# Patient Record
Sex: FEMALE | Race: WHITE | Employment: FULL TIME | URBAN - METROPOLITAN AREA
[De-identification: names, ages, dates, MRNs, and addresses within clinical notes are randomized per-mention and may not be internally consistent; named-entity substitution may affect disease eponyms.]

---

## 2017-02-13 RX ORDER — ATORVASTATIN CALCIUM 40 MG/1
TABLET, FILM COATED ORAL
Qty: 90 TABLET | Refills: 1 | OUTPATIENT
Start: 2017-02-13

## 2017-02-20 ENCOUNTER — OFFICE VISIT (OUTPATIENT)
Dept: FAMILY MEDICINE CLINIC | Age: 62
End: 2017-02-20

## 2017-02-20 VITALS
HEIGHT: 66 IN | DIASTOLIC BLOOD PRESSURE: 76 MMHG | SYSTOLIC BLOOD PRESSURE: 108 MMHG | RESPIRATION RATE: 12 BRPM | OXYGEN SATURATION: 98 % | WEIGHT: 177 LBS | BODY MASS INDEX: 28.45 KG/M2 | HEART RATE: 74 BPM

## 2017-02-20 DIAGNOSIS — E78.00 PURE HYPERCHOLESTEROLEMIA: ICD-10-CM

## 2017-02-20 DIAGNOSIS — N18.30 CHRONIC KIDNEY DISEASE, STAGE III (MODERATE) (HCC): ICD-10-CM

## 2017-02-20 DIAGNOSIS — R73.03 PREDIABETES: ICD-10-CM

## 2017-02-20 DIAGNOSIS — M81.0 OSTEOPOROSIS: ICD-10-CM

## 2017-02-20 DIAGNOSIS — E55.9 VITAMIN D DEFICIENCY: ICD-10-CM

## 2017-02-20 DIAGNOSIS — I10 ESSENTIAL HYPERTENSION: Primary | ICD-10-CM

## 2017-02-20 LAB
ALBUMIN SERPL-MCNC: 4.2 G/DL (ref 3.4–5)
ALP BLD-CCNC: 75 U/L (ref 40–129)
ALT SERPL-CCNC: 13 U/L (ref 10–40)
ANION GAP SERPL CALCULATED.3IONS-SCNC: 15 MMOL/L (ref 3–16)
AST SERPL-CCNC: 12 U/L (ref 15–37)
BILIRUB SERPL-MCNC: 0.3 MG/DL (ref 0–1)
BILIRUBIN DIRECT: <0.2 MG/DL (ref 0–0.3)
BILIRUBIN, INDIRECT: ABNORMAL MG/DL (ref 0–1)
BUN BLDV-MCNC: 14 MG/DL (ref 7–20)
CALCIUM SERPL-MCNC: 10.2 MG/DL (ref 8.3–10.6)
CHLORIDE BLD-SCNC: 103 MMOL/L (ref 99–110)
CHOLESTEROL, TOTAL: 164 MG/DL (ref 0–199)
CO2: 23 MMOL/L (ref 21–32)
CREAT SERPL-MCNC: 0.8 MG/DL (ref 0.6–1.2)
GFR AFRICAN AMERICAN: >60
GFR NON-AFRICAN AMERICAN: >60
GLUCOSE BLD-MCNC: 101 MG/DL (ref 70–99)
HBA1C MFR BLD: 6.2 %
HDLC SERPL-MCNC: 57 MG/DL (ref 40–60)
LDL CHOLESTEROL CALCULATED: 92 MG/DL
POTASSIUM SERPL-SCNC: 4.5 MMOL/L (ref 3.5–5.1)
SODIUM BLD-SCNC: 141 MMOL/L (ref 136–145)
TOTAL PROTEIN: 7.4 G/DL (ref 6.4–8.2)
TRIGL SERPL-MCNC: 73 MG/DL (ref 0–150)
VITAMIN D 25-HYDROXY: 58.5 NG/ML
VLDLC SERPL CALC-MCNC: 15 MG/DL

## 2017-02-20 PROCEDURE — 83036 HEMOGLOBIN GLYCOSYLATED A1C: CPT | Performed by: FAMILY MEDICINE

## 2017-02-20 PROCEDURE — 36415 COLL VENOUS BLD VENIPUNCTURE: CPT | Performed by: FAMILY MEDICINE

## 2017-02-20 PROCEDURE — 99214 OFFICE O/P EST MOD 30 MIN: CPT | Performed by: FAMILY MEDICINE

## 2017-02-20 RX ORDER — LISINOPRIL 10 MG/1
TABLET ORAL
Qty: 90 TABLET | Refills: 1 | Status: SHIPPED | OUTPATIENT
Start: 2017-02-20 | End: 2017-08-18 | Stop reason: SDUPTHER

## 2017-02-20 RX ORDER — ALENDRONATE SODIUM 70 MG/1
70 TABLET ORAL
Qty: 12 TABLET | Refills: 3 | Status: SHIPPED | OUTPATIENT
Start: 2017-02-20 | End: 2018-01-12 | Stop reason: SDUPTHER

## 2017-02-20 RX ORDER — ATORVASTATIN CALCIUM 40 MG/1
TABLET, FILM COATED ORAL
Qty: 90 TABLET | Refills: 1 | Status: SHIPPED | OUTPATIENT
Start: 2017-02-20 | End: 2017-07-29 | Stop reason: SDUPTHER

## 2017-04-17 PROBLEM — D36.9 TUBULAR ADENOMA: Status: ACTIVE | Noted: 2017-04-17

## 2017-07-29 DIAGNOSIS — E78.00 PURE HYPERCHOLESTEROLEMIA: ICD-10-CM

## 2017-07-31 RX ORDER — ATORVASTATIN CALCIUM 40 MG/1
TABLET, FILM COATED ORAL
Qty: 30 TABLET | Refills: 0 | Status: SHIPPED | OUTPATIENT
Start: 2017-07-31 | End: 2017-08-18 | Stop reason: SDUPTHER

## 2017-08-18 ENCOUNTER — OFFICE VISIT (OUTPATIENT)
Dept: FAMILY MEDICINE CLINIC | Age: 62
End: 2017-08-18

## 2017-08-18 VITALS
SYSTOLIC BLOOD PRESSURE: 104 MMHG | DIASTOLIC BLOOD PRESSURE: 78 MMHG | HEART RATE: 75 BPM | WEIGHT: 178 LBS | BODY MASS INDEX: 28.61 KG/M2 | HEIGHT: 66 IN | RESPIRATION RATE: 12 BRPM

## 2017-08-18 DIAGNOSIS — Z23 FLU VACCINE NEED: ICD-10-CM

## 2017-08-18 DIAGNOSIS — N18.30 CHRONIC KIDNEY DISEASE, STAGE III (MODERATE) (HCC): ICD-10-CM

## 2017-08-18 DIAGNOSIS — Z11.4 SCREENING FOR HIV (HUMAN IMMUNODEFICIENCY VIRUS): ICD-10-CM

## 2017-08-18 DIAGNOSIS — M81.6 LOCALIZED OSTEOPOROSIS WITHOUT CURRENT PATHOLOGICAL FRACTURE: ICD-10-CM

## 2017-08-18 DIAGNOSIS — I10 ESSENTIAL HYPERTENSION: Primary | ICD-10-CM

## 2017-08-18 DIAGNOSIS — R73.03 PREDIABETES: ICD-10-CM

## 2017-08-18 DIAGNOSIS — E55.9 VITAMIN D DEFICIENCY: ICD-10-CM

## 2017-08-18 DIAGNOSIS — E78.00 PURE HYPERCHOLESTEROLEMIA: ICD-10-CM

## 2017-08-18 DIAGNOSIS — Z23 NEED FOR INFLUENZA VACCINATION: ICD-10-CM

## 2017-08-18 DIAGNOSIS — Z11.59 NEED FOR HEPATITIS C SCREENING TEST: ICD-10-CM

## 2017-08-18 LAB
ANION GAP SERPL CALCULATED.3IONS-SCNC: 20 MMOL/L (ref 3–16)
BUN BLDV-MCNC: 26 MG/DL (ref 7–20)
CALCIUM SERPL-MCNC: 10.3 MG/DL (ref 8.3–10.6)
CHLORIDE BLD-SCNC: 99 MMOL/L (ref 99–110)
CO2: 18 MMOL/L (ref 21–32)
CREAT SERPL-MCNC: 0.9 MG/DL (ref 0.6–1.2)
GFR AFRICAN AMERICAN: >60
GFR NON-AFRICAN AMERICAN: >60
GLUCOSE BLD-MCNC: 95 MG/DL (ref 70–99)
HEPATITIS C ANTIBODY INTERPRETATION: NORMAL
POTASSIUM SERPL-SCNC: 4.1 MMOL/L (ref 3.5–5.1)
SODIUM BLD-SCNC: 137 MMOL/L (ref 136–145)

## 2017-08-18 PROCEDURE — 90471 IMMUNIZATION ADMIN: CPT | Performed by: FAMILY MEDICINE

## 2017-08-18 PROCEDURE — 36415 COLL VENOUS BLD VENIPUNCTURE: CPT | Performed by: FAMILY MEDICINE

## 2017-08-18 PROCEDURE — 99214 OFFICE O/P EST MOD 30 MIN: CPT | Performed by: FAMILY MEDICINE

## 2017-08-18 PROCEDURE — 90662 IIV NO PRSV INCREASED AG IM: CPT | Performed by: FAMILY MEDICINE

## 2017-08-18 RX ORDER — ATORVASTATIN CALCIUM 40 MG/1
TABLET, FILM COATED ORAL
Qty: 90 TABLET | Refills: 1 | Status: SHIPPED | OUTPATIENT
Start: 2017-08-18 | End: 2018-03-05 | Stop reason: SDUPTHER

## 2017-08-18 RX ORDER — LISINOPRIL 10 MG/1
TABLET ORAL
Qty: 90 TABLET | Refills: 1 | Status: SHIPPED | OUTPATIENT
Start: 2017-08-18 | End: 2018-02-25 | Stop reason: SDUPTHER

## 2017-08-18 ASSESSMENT — PATIENT HEALTH QUESTIONNAIRE - PHQ9
2. FEELING DOWN, DEPRESSED OR HOPELESS: 0
1. LITTLE INTEREST OR PLEASURE IN DOING THINGS: 0
SUM OF ALL RESPONSES TO PHQ9 QUESTIONS 1 & 2: 0
SUM OF ALL RESPONSES TO PHQ QUESTIONS 1-9: 0

## 2017-08-19 LAB
ESTIMATED AVERAGE GLUCOSE: 131.2 MG/DL
HBA1C MFR BLD: 6.2 %

## 2017-08-21 LAB — HIV-1 AND HIV-2 ANTIBODIES: NORMAL

## 2017-08-25 ENCOUNTER — HOSPITAL ENCOUNTER (OUTPATIENT)
Dept: WOMENS IMAGING | Age: 62
Discharge: HOME | End: 2017-08-25
Attending: OBSTETRICS & GYNECOLOGY | Admitting: OBSTETRICS & GYNECOLOGY

## 2017-08-25 DIAGNOSIS — Z12.31 VISIT FOR SCREENING MAMMOGRAM: ICD-10-CM

## 2018-01-12 DIAGNOSIS — M81.0 OSTEOPOROSIS: ICD-10-CM

## 2018-01-12 RX ORDER — ALENDRONATE SODIUM 70 MG/1
TABLET ORAL
Qty: 12 TABLET | Refills: 3 | Status: SHIPPED | OUTPATIENT
Start: 2018-01-12 | End: 2018-12-06 | Stop reason: SDUPTHER

## 2018-01-15 ENCOUNTER — OFFICE VISIT (OUTPATIENT)
Dept: FAMILY MEDICINE CLINIC | Age: 63
End: 2018-01-15

## 2018-01-15 VITALS
OXYGEN SATURATION: 96 % | SYSTOLIC BLOOD PRESSURE: 122 MMHG | WEIGHT: 182 LBS | RESPIRATION RATE: 16 BRPM | TEMPERATURE: 98.5 F | BODY MASS INDEX: 29.25 KG/M2 | DIASTOLIC BLOOD PRESSURE: 78 MMHG | HEIGHT: 66 IN | HEART RATE: 87 BPM

## 2018-01-15 DIAGNOSIS — S16.1XXA STRAIN OF STERNOCLEIDOMASTOID MUSCLE, INITIAL ENCOUNTER: Primary | ICD-10-CM

## 2018-01-15 PROCEDURE — 1036F TOBACCO NON-USER: CPT | Performed by: FAMILY MEDICINE

## 2018-01-15 PROCEDURE — G8427 DOCREV CUR MEDS BY ELIG CLIN: HCPCS | Performed by: FAMILY MEDICINE

## 2018-01-15 PROCEDURE — G8419 CALC BMI OUT NRM PARAM NOF/U: HCPCS | Performed by: FAMILY MEDICINE

## 2018-01-15 PROCEDURE — 3017F COLORECTAL CA SCREEN DOC REV: CPT | Performed by: FAMILY MEDICINE

## 2018-01-15 PROCEDURE — G8484 FLU IMMUNIZE NO ADMIN: HCPCS | Performed by: FAMILY MEDICINE

## 2018-01-15 PROCEDURE — 99214 OFFICE O/P EST MOD 30 MIN: CPT | Performed by: FAMILY MEDICINE

## 2018-01-15 PROCEDURE — 3014F SCREEN MAMMO DOC REV: CPT | Performed by: FAMILY MEDICINE

## 2018-01-15 RX ORDER — NAPROXEN 500 MG/1
500 TABLET ORAL 2 TIMES DAILY WITH MEALS
Qty: 60 TABLET | Refills: 0 | Status: SHIPPED | OUTPATIENT
Start: 2018-01-15 | End: 2019-02-15

## 2018-02-25 DIAGNOSIS — I10 ESSENTIAL HYPERTENSION: ICD-10-CM

## 2018-02-27 ENCOUNTER — TELEPHONE (OUTPATIENT)
Dept: FAMILY MEDICINE CLINIC | Age: 63
End: 2018-02-27

## 2018-02-27 RX ORDER — LISINOPRIL 10 MG/1
TABLET ORAL
Qty: 30 TABLET | Refills: 0 | Status: SHIPPED | OUTPATIENT
Start: 2018-02-27 | End: 2018-03-05 | Stop reason: SDUPTHER

## 2018-03-05 ENCOUNTER — OFFICE VISIT (OUTPATIENT)
Dept: FAMILY MEDICINE CLINIC | Age: 63
End: 2018-03-05

## 2018-03-05 VITALS
WEIGHT: 183 LBS | SYSTOLIC BLOOD PRESSURE: 106 MMHG | HEIGHT: 66 IN | HEART RATE: 84 BPM | RESPIRATION RATE: 16 BRPM | DIASTOLIC BLOOD PRESSURE: 68 MMHG | BODY MASS INDEX: 29.41 KG/M2

## 2018-03-05 DIAGNOSIS — R73.03 PREDIABETES: Primary | ICD-10-CM

## 2018-03-05 DIAGNOSIS — M81.6 LOCALIZED OSTEOPOROSIS WITHOUT CURRENT PATHOLOGICAL FRACTURE: ICD-10-CM

## 2018-03-05 DIAGNOSIS — E55.9 VITAMIN D DEFICIENCY: ICD-10-CM

## 2018-03-05 DIAGNOSIS — Z23 NEED FOR SHINGLES VACCINE: ICD-10-CM

## 2018-03-05 DIAGNOSIS — N18.30 CHRONIC KIDNEY DISEASE, STAGE III (MODERATE) (HCC): ICD-10-CM

## 2018-03-05 DIAGNOSIS — I10 ESSENTIAL HYPERTENSION: ICD-10-CM

## 2018-03-05 DIAGNOSIS — E78.00 PURE HYPERCHOLESTEROLEMIA: ICD-10-CM

## 2018-03-05 LAB
A/G RATIO: 1.4 (ref 1.1–2.2)
ALBUMIN SERPL-MCNC: 4.4 G/DL (ref 3.4–5)
ALP BLD-CCNC: 56 U/L (ref 40–129)
ALT SERPL-CCNC: 16 U/L (ref 10–40)
ANION GAP SERPL CALCULATED.3IONS-SCNC: 14 MMOL/L (ref 3–16)
AST SERPL-CCNC: 14 U/L (ref 15–37)
BILIRUB SERPL-MCNC: 0.3 MG/DL (ref 0–1)
BUN BLDV-MCNC: 16 MG/DL (ref 7–20)
CALCIUM SERPL-MCNC: 9.5 MG/DL (ref 8.3–10.6)
CHLORIDE BLD-SCNC: 102 MMOL/L (ref 99–110)
CHOLESTEROL, TOTAL: 164 MG/DL (ref 0–199)
CO2: 23 MMOL/L (ref 21–32)
CREAT SERPL-MCNC: 0.8 MG/DL (ref 0.6–1.2)
GFR AFRICAN AMERICAN: >60
GFR NON-AFRICAN AMERICAN: >60
GLOBULIN: 3.2 G/DL
GLUCOSE BLD-MCNC: 96 MG/DL (ref 70–99)
HBA1C MFR BLD: 6.1 %
HDLC SERPL-MCNC: 57 MG/DL (ref 40–60)
LDL CHOLESTEROL CALCULATED: 91 MG/DL
POTASSIUM SERPL-SCNC: 4.7 MMOL/L (ref 3.5–5.1)
SODIUM BLD-SCNC: 139 MMOL/L (ref 136–145)
TOTAL PROTEIN: 7.6 G/DL (ref 6.4–8.2)
TRIGL SERPL-MCNC: 80 MG/DL (ref 0–150)
VITAMIN D 25-HYDROXY: 55.3 NG/ML
VLDLC SERPL CALC-MCNC: 16 MG/DL

## 2018-03-05 PROCEDURE — 36415 COLL VENOUS BLD VENIPUNCTURE: CPT | Performed by: FAMILY MEDICINE

## 2018-03-05 PROCEDURE — 3017F COLORECTAL CA SCREEN DOC REV: CPT | Performed by: FAMILY MEDICINE

## 2018-03-05 PROCEDURE — G8419 CALC BMI OUT NRM PARAM NOF/U: HCPCS | Performed by: FAMILY MEDICINE

## 2018-03-05 PROCEDURE — 99214 OFFICE O/P EST MOD 30 MIN: CPT | Performed by: FAMILY MEDICINE

## 2018-03-05 PROCEDURE — 3014F SCREEN MAMMO DOC REV: CPT | Performed by: FAMILY MEDICINE

## 2018-03-05 PROCEDURE — 1036F TOBACCO NON-USER: CPT | Performed by: FAMILY MEDICINE

## 2018-03-05 PROCEDURE — 83036 HEMOGLOBIN GLYCOSYLATED A1C: CPT | Performed by: FAMILY MEDICINE

## 2018-03-05 PROCEDURE — G8427 DOCREV CUR MEDS BY ELIG CLIN: HCPCS | Performed by: FAMILY MEDICINE

## 2018-03-05 PROCEDURE — G8484 FLU IMMUNIZE NO ADMIN: HCPCS | Performed by: FAMILY MEDICINE

## 2018-03-05 RX ORDER — LISINOPRIL 10 MG/1
TABLET ORAL
Qty: 90 TABLET | Refills: 1 | Status: SHIPPED | OUTPATIENT
Start: 2018-03-05 | End: 2018-09-01 | Stop reason: SDUPTHER

## 2018-03-05 RX ORDER — ATORVASTATIN CALCIUM 40 MG/1
TABLET, FILM COATED ORAL
Qty: 90 TABLET | Refills: 1 | Status: SHIPPED | OUTPATIENT
Start: 2018-03-05 | End: 2018-08-14 | Stop reason: SDUPTHER

## 2018-03-05 NOTE — PROGRESS NOTES
Subjective:      Patient ID: Alease Holstein is a 61 y.o. female. HPI     CC:  HTN, HLD, prediabetes, stage 3 CKD, osteoporosis      Treatment Adherence:   Medication compliance:  compliant all of the time  Diet compliance:  compliant most of the time  Weight trend: stable  Current exercise: no regular exercise  Barriers: none    Prediabetes: pt denies  blurry vision, foot ulcerations, neuropathy, polyphagia, polyuria, polydipsia, nausea, vomiting and diarrhea. Pt feels like she has been doing good keeping her carbs at proper portion sizes. She plans on joining a walking group in [place], as she is not currently getting any exercise. Home blood sugar records: patient does not test      Hypertension:  Home blood pressure monitoring: No.  She is adherent to a low sodium diet. Patient denies chest pain, shortness of breath, headache, lightheadedness, blurred vision, peripheral edema, palpitations, dry cough and fatigue. Antihypertensive medication side effects: no medication side effects noted. Use of agents associated with hypertension: none. Hyperlipidemia:  No new myalgias or GI upset on atorvastatin (Lipitor). Lab Results   Component Value Date    LABA1C 6.2 08/18/2017    LABA1C 6.2 02/20/2017    LABA1C 6.0 08/17/2016     Lab Results   Component Value Date    CREATININE 0.9 08/18/2017     Lab Results   Component Value Date    ALT 13 02/20/2017    AST 12 (L) 02/20/2017     Lab Results   Component Value Date    CHOL 164 02/20/2017    TRIG 73 02/20/2017    HDL 57 02/20/2017    LDLCALC 92 02/20/2017           Chronic kidney disease, stage III (moderate)  Pt had 3 in a row that were in stage 3 range, but last 2 were WNLs.      2/3/16: ULTRASOUND KIDNEYS  Impression   IMPRESSION:   Mild diffuse increased echogenicity kidneys consistent with medical renal   disease.  No hydronephrosis or focal lesion.          Osteoporosis/osteopenia:  Current pharmacologic therapy and date started Fosamax 70mg and started June 2017. Medication side effects: none. Last DXA:  10/19/2016-  T score at spine -2.7 and lowest hip -1.8. Current calcium intake is at least 1200 mg/day from diet and supplements: yes. She is currently taking 1000 IU/day of supplemental vitamin D. Regular weight-bearing exercise: no. The patient's mother had osteoporosis and broke her hip. Vit D, 25-Hydroxy (ng/mL)   Date Value   02/20/2017 58.5           Vitals:    03/05/18 1007   BP: 106/68   Pulse: 84   Resp: 16   Weight: 183 lb (83 kg)   Height: 5' 6\" (1.676 m)       Outpatient Prescriptions Marked as Taking for the 3/5/18 encounter (Office Visit) with Mylene James MD   Medication Sig Dispense Refill    lisinopril (PRINIVIL;ZESTRIL) 10 MG tablet TAKE 1 TABLET DAILY 90 tablet 1    atorvastatin (LIPITOR) 40 MG tablet TAKE 1 TABLET DAILY 90 tablet 1    zoster recombinant adjuvanted vaccine (SHINGRIX) 50 MCG SUSR injection Inject 0.5 mLs into the muscle once for 1 dose 0.5 mL 0    Ibuprofen (ADVIL PO) Take by mouth      naproxen (NAPROSYN) 500 MG tablet Take 1 tablet by mouth 2 times daily (with meals) 60 tablet 0    alendronate (FOSAMAX) 70 MG tablet TAKE 1 TABLET EVERY 7 DAYS 12 tablet 3    montelukast (SINGULAIR) 10 MG tablet Take 10 mg by mouth nightly.  cetirizine (ZYRTEC) 10 MG tablet Take 10 mg by mouth as needed for Allergies.  mometasone (NASONEX) 50 MCG/ACT nasal spray 2 sprays by Nasal route daily.  vitamin D (CHOLECALCIFEROL) 1000 UNIT TABS tablet Take 1,000 Units by mouth daily.  Omega-3 Fatty Acids (FISH OIL PO) Take  by mouth.  aspirin 81 MG tablet Take 81 mg by mouth daily. Past Medical History:   Diagnosis Date    Allergic rhinitis     saw allergist, dr Maryanne Buchanan of years.   had allergy shots in the past    Chronic kidney disease, stage III (moderate)     Hyperlipidemia LDL goal < 130     Hypertension     Osteoporosis     Prediabetes        Past Surgical History: Fosamax. Will recheck DEXA in June 2018 as this is when patient will be on meds for one year. Checking vitamin D today to ensure it is within normal limits.     HM  shingrix script given today

## 2018-08-14 DIAGNOSIS — E78.00 PURE HYPERCHOLESTEROLEMIA: ICD-10-CM

## 2018-08-14 RX ORDER — ATORVASTATIN CALCIUM 40 MG/1
TABLET, FILM COATED ORAL
Qty: 90 TABLET | Refills: 1 | Status: SHIPPED | OUTPATIENT
Start: 2018-08-14 | End: 2019-02-15 | Stop reason: SDUPTHER

## 2018-09-01 DIAGNOSIS — I10 ESSENTIAL HYPERTENSION: ICD-10-CM

## 2018-09-04 RX ORDER — LISINOPRIL 10 MG/1
TABLET ORAL
Qty: 90 TABLET | Refills: 1 | Status: SHIPPED | OUTPATIENT
Start: 2018-09-04 | End: 2018-09-25 | Stop reason: ALTCHOICE

## 2018-09-25 ENCOUNTER — OFFICE VISIT (OUTPATIENT)
Dept: FAMILY MEDICINE CLINIC | Age: 63
End: 2018-09-25
Payer: COMMERCIAL

## 2018-09-25 VITALS
RESPIRATION RATE: 12 BRPM | WEIGHT: 183 LBS | SYSTOLIC BLOOD PRESSURE: 94 MMHG | DIASTOLIC BLOOD PRESSURE: 64 MMHG | OXYGEN SATURATION: 94 % | BODY MASS INDEX: 29.41 KG/M2 | HEIGHT: 66 IN | HEART RATE: 94 BPM

## 2018-09-25 DIAGNOSIS — E78.00 PURE HYPERCHOLESTEROLEMIA: ICD-10-CM

## 2018-09-25 DIAGNOSIS — I10 ESSENTIAL HYPERTENSION: ICD-10-CM

## 2018-09-25 DIAGNOSIS — Z23 FLU VACCINE NEED: ICD-10-CM

## 2018-09-25 DIAGNOSIS — M81.6 LOCALIZED OSTEOPOROSIS WITHOUT CURRENT PATHOLOGICAL FRACTURE: ICD-10-CM

## 2018-09-25 DIAGNOSIS — R73.03 PREDIABETES: Primary | ICD-10-CM

## 2018-09-25 LAB
ANION GAP SERPL CALCULATED.3IONS-SCNC: 15 MMOL/L (ref 3–16)
BUN BLDV-MCNC: 26 MG/DL (ref 7–20)
CALCIUM SERPL-MCNC: 11.1 MG/DL (ref 8.3–10.6)
CHLORIDE BLD-SCNC: 98 MMOL/L (ref 99–110)
CO2: 23 MMOL/L (ref 21–32)
CREAT SERPL-MCNC: 1 MG/DL (ref 0.6–1.2)
GFR AFRICAN AMERICAN: >60
GFR NON-AFRICAN AMERICAN: 56
GLUCOSE BLD-MCNC: 96 MG/DL (ref 70–99)
HBA1C MFR BLD: 6.3 %
POTASSIUM SERPL-SCNC: 4.6 MMOL/L (ref 3.5–5.1)
SODIUM BLD-SCNC: 136 MMOL/L (ref 136–145)

## 2018-09-25 PROCEDURE — 3017F COLORECTAL CA SCREEN DOC REV: CPT | Performed by: FAMILY MEDICINE

## 2018-09-25 PROCEDURE — G8419 CALC BMI OUT NRM PARAM NOF/U: HCPCS | Performed by: FAMILY MEDICINE

## 2018-09-25 PROCEDURE — G8427 DOCREV CUR MEDS BY ELIG CLIN: HCPCS | Performed by: FAMILY MEDICINE

## 2018-09-25 PROCEDURE — 1036F TOBACCO NON-USER: CPT | Performed by: FAMILY MEDICINE

## 2018-09-25 PROCEDURE — 83036 HEMOGLOBIN GLYCOSYLATED A1C: CPT | Performed by: FAMILY MEDICINE

## 2018-09-25 PROCEDURE — 36415 COLL VENOUS BLD VENIPUNCTURE: CPT | Performed by: FAMILY MEDICINE

## 2018-09-25 PROCEDURE — 90682 RIV4 VACC RECOMBINANT DNA IM: CPT | Performed by: FAMILY MEDICINE

## 2018-09-25 PROCEDURE — 99214 OFFICE O/P EST MOD 30 MIN: CPT | Performed by: FAMILY MEDICINE

## 2018-09-25 PROCEDURE — 90471 IMMUNIZATION ADMIN: CPT | Performed by: FAMILY MEDICINE

## 2018-09-25 RX ORDER — LISINOPRIL 5 MG/1
5 TABLET ORAL DAILY
Qty: 90 TABLET | Refills: 1
Start: 2018-09-25 | End: 2019-10-14

## 2018-09-25 ASSESSMENT — PATIENT HEALTH QUESTIONNAIRE - PHQ9
SUM OF ALL RESPONSES TO PHQ9 QUESTIONS 1 & 2: 0
2. FEELING DOWN, DEPRESSED OR HOPELESS: 0
1. LITTLE INTEREST OR PLEASURE IN DOING THINGS: 0
SUM OF ALL RESPONSES TO PHQ QUESTIONS 1-9: 0
SUM OF ALL RESPONSES TO PHQ QUESTIONS 1-9: 0

## 2018-09-25 NOTE — PROGRESS NOTES
thyromegaly  Resp:  · Normal effort  · Clear to auscultation bilaterally without rhonchi, wheezing or crackles  Cardiovascular:  · On auscultation, normal S1 and S2 without murmurs, rubs or gallops  · No bruits of bilateral carotids and no JVD  Gastrointestinal:  · Nontender, nondistended, and no masses  · No hepatosplenomegaly  Musculoskeletal:  · Normal Gait  · All extremities without clubbing, cyanosis or edema  Psych:  · Normal mood and affect  · Normal insight and judgement      Assessment:      See below       Plan:       1. Essential hypertension  Too low today. Pt hasn't eaten yet today and it is mid afternoon. Decreasing lisinopril to 5mg. Checking BMP . 2. Prediabetes  Poc A1c equals 6.3. Worsening. Encouraged patient to keep carbs to no more than 45 g per meal or less and no more than 15 g per snack or less. Patient to walk 30 minutes daily. 3. Hyperlipidemia   Continue Lipitor. Reviewed most recent fasting lipid panel and LFTs in both within normal limits. 4.  osteoporosis  Patient to continue Fosamax.     Checking DEXA scan now that on meds for one year    HM  shingrix script given today    Encouraged to schedule pap smear    Flu shot given today    F/u 3 months

## 2018-09-26 ENCOUNTER — TELEPHONE (OUTPATIENT)
Dept: FAMILY MEDICINE CLINIC | Age: 63
End: 2018-09-26

## 2018-09-26 DIAGNOSIS — E83.52 HYPERCALCEMIA: Primary | ICD-10-CM

## 2018-09-26 LAB — PARATHYROID HORMONE INTACT: 11.9 PG/ML (ref 14–72)

## 2018-09-30 ENCOUNTER — TELEPHONE (OUTPATIENT)
Dept: FAMILY MEDICINE CLINIC | Age: 63
End: 2018-09-30

## 2018-10-02 ENCOUNTER — TELEPHONE (OUTPATIENT)
Dept: FAMILY MEDICINE CLINIC | Age: 63
End: 2018-10-02

## 2018-10-02 DIAGNOSIS — E83.52 SERUM CALCIUM ELEVATED: Primary | ICD-10-CM

## 2018-10-05 ENCOUNTER — OFFICE VISIT (OUTPATIENT)
Dept: FAMILY MEDICINE CLINIC | Age: 63
End: 2018-10-05
Payer: COMMERCIAL

## 2018-10-05 VITALS
BODY MASS INDEX: 29.09 KG/M2 | WEIGHT: 181 LBS | HEIGHT: 66 IN | SYSTOLIC BLOOD PRESSURE: 100 MMHG | HEART RATE: 74 BPM | DIASTOLIC BLOOD PRESSURE: 78 MMHG

## 2018-10-05 DIAGNOSIS — E83.52 HYPERCALCEMIA: ICD-10-CM

## 2018-10-05 DIAGNOSIS — E83.52 SERUM CALCIUM ELEVATED: ICD-10-CM

## 2018-10-05 DIAGNOSIS — E83.52 HYPERCALCEMIA: Primary | ICD-10-CM

## 2018-10-05 LAB
ALBUMIN SERPL-MCNC: 4.3 G/DL (ref 3.4–5)
ALP BLD-CCNC: 62 U/L (ref 40–129)
ALT SERPL-CCNC: 15 U/L (ref 10–40)
AST SERPL-CCNC: 15 U/L (ref 15–37)
BILIRUB SERPL-MCNC: 0.3 MG/DL (ref 0–1)
BILIRUBIN DIRECT: <0.2 MG/DL (ref 0–0.3)
BILIRUBIN, INDIRECT: NORMAL MG/DL (ref 0–1)
TOTAL PROTEIN: 7.9 G/DL (ref 6.4–8.2)
TSH REFLEX FT4: 1.57 UIU/ML (ref 0.27–4.2)
VITAMIN D 25-HYDROXY: 51.1 NG/ML

## 2018-10-05 PROCEDURE — G8482 FLU IMMUNIZE ORDER/ADMIN: HCPCS | Performed by: FAMILY MEDICINE

## 2018-10-05 PROCEDURE — G8419 CALC BMI OUT NRM PARAM NOF/U: HCPCS | Performed by: FAMILY MEDICINE

## 2018-10-05 PROCEDURE — G8427 DOCREV CUR MEDS BY ELIG CLIN: HCPCS | Performed by: FAMILY MEDICINE

## 2018-10-05 PROCEDURE — 3017F COLORECTAL CA SCREEN DOC REV: CPT | Performed by: FAMILY MEDICINE

## 2018-10-05 PROCEDURE — 99214 OFFICE O/P EST MOD 30 MIN: CPT | Performed by: FAMILY MEDICINE

## 2018-10-05 PROCEDURE — 1036F TOBACCO NON-USER: CPT | Performed by: FAMILY MEDICINE

## 2018-10-05 NOTE — PROGRESS NOTES
Fatty Acids (FISH OIL PO) Take  by mouth.  aspirin 81 MG tablet Take 81 mg by mouth daily. Past Medical History:   Diagnosis Date    Allergic rhinitis     saw allergist, dr Uzair Jimenez of years.   had allergy shots in the past    Chronic kidney disease, stage III (moderate) (HCC)     Hyperlipidemia LDL goal < 130     Hypertension     Osteoporosis     Prediabetes        Past Surgical History:   Procedure Laterality Date    WISDOM TOOTH EXTRACTION         Social History   Substance Use Topics    Smoking status: Never Smoker    Smokeless tobacco: Never Used    Alcohol use Not on file       Family History   Problem Relation Age of Onset    High Blood Pressure Mother     Heart Disease Mother     Cancer Mother 79        breast    High Blood Pressure Father     Heart Disease Father     Heart Disease Paternal Grandfather     Cancer Other         one niece  of breast cancer in 45s, on  of lymphoma of 25s           ROS  Constitutional:  Negative for activity or appetite change, fever or fatigue  HENT:  Negative for congestion, sinus pressure, or rhinorrhea  Eyes:  Negative for eye pain or visual changes  Resp:  Negative for SOB, chest tightness, cough  Cardiovascular: Negative for CP, palpitations, OBREGON, orthopnea, PND, LE edema  Gastrointestinal: Negative for abd pain, melena, BRBPR, N/V/D  Endocrine:  Negative for polydipsia and polyuria  :  Negative for dysuria, flank pain or urinary frequency  Musculoskeletal:  Negative for back pain or myalgias  Neuro:  Negative for dizziness or lightheadedness  Psych: negative for depression or anxiety      Objective:   Constitutional:   · Reviewed vitals above  · Well Nourished, well developed, no distress       HENT:  · Normal external nose without lesions  · Normal nasal mucosa without swelling or erythema  Neck:  · Symmetric and without masses  · No thyromegaly  Resp:  · Normal effort  · Clear to auscultation bilaterally without rhonchi, wheezing

## 2018-10-06 LAB
KAPPA, FREE LIGHT CHAINS, SERUM: 18.89 MG/L (ref 3.3–19.4)
KAPPA/LAMBDA RATIO: 1.36 (ref 0.26–1.65)
KAPPA/LAMBDA TEST COMMENT: NORMAL
LAMBDA, FREE LIGHT CHAINS, SERUM: 13.94 MG/L (ref 5.71–26.3)

## 2018-10-07 LAB
CALCIUM IONIZED, CALC AT PH 7.4: 1.32 MMOL/L (ref 1.11–1.3)
CALCIUM IONIZED: 1.31 MMOL/L (ref 1.11–1.3)
VITAMIN D 1,25-DIHYDROXY: 41.1 PG/ML (ref 19.9–79.3)

## 2018-10-08 LAB
ALBUMIN SERPL-MCNC: 3.8 G/DL (ref 3.1–4.9)
ALPHA-1-GLOBULIN: 0.2 G/DL (ref 0.2–0.4)
ALPHA-2-GLOBULIN: 0.9 G/DL (ref 0.4–1.1)
BETA GLOBULIN: 1.4 G/DL (ref 0.9–1.6)
GAMMA GLOBULIN: 1.7 G/DL (ref 0.6–1.8)
SPE/IFE INTERPRETATION: NORMAL
TOTAL PROTEIN: 7.9 G/DL (ref 6.4–8.2)

## 2018-10-18 ENCOUNTER — TELEPHONE (OUTPATIENT)
Dept: FAMILY MEDICINE CLINIC | Age: 63
End: 2018-10-18

## 2018-10-25 LAB — PTH RELATED PEPTIDE: 2.5 PMOL/L (ref 0–3.4)

## 2018-10-29 ENCOUNTER — TELEPHONE (OUTPATIENT)
Dept: FAMILY MEDICINE CLINIC | Age: 63
End: 2018-10-29

## 2018-10-29 NOTE — TELEPHONE ENCOUNTER
Inform pt that all her blood work finally came back    It is all normal, except for her calcium    Dr Tonja Jacobs spoke with a specialist, and he is recommending she get her DEXA bone scan ASAP to r/o other bony diseases.       This is already ordered    Give her number to schedule    Route back to confirm once done

## 2018-11-02 ENCOUNTER — HOSPITAL ENCOUNTER (OUTPATIENT)
Dept: WOMENS IMAGING | Age: 63
Discharge: HOME | End: 2018-11-02
Payer: COMMERCIAL

## 2018-11-02 DIAGNOSIS — Z12.31 VISIT FOR SCREENING MAMMOGRAM: ICD-10-CM

## 2018-11-02 DIAGNOSIS — M81.6 LOCALIZED OSTEOPOROSIS WITHOUT CURRENT PATHOLOGICAL FRACTURE: ICD-10-CM

## 2018-11-02 PROCEDURE — 77063 BREAST TOMOSYNTHESIS BI: CPT

## 2018-11-02 PROCEDURE — 77080 DXA BONE DENSITY AXIAL: CPT

## 2018-11-04 ENCOUNTER — TELEPHONE (OUTPATIENT)
Dept: FAMILY MEDICINE CLINIC | Age: 63
End: 2018-11-04

## 2018-11-05 NOTE — TELEPHONE ENCOUNTER
Inform pt that DEXA scan shows some mild improvement in her bone density    Find out how much calcium she is taking a day, and route back

## 2018-11-09 DIAGNOSIS — E83.52 HYPERCALCEMIA: Primary | ICD-10-CM

## 2018-11-21 ENCOUNTER — TELEPHONE (OUTPATIENT)
Dept: FAMILY MEDICINE CLINIC | Age: 63
End: 2018-11-21

## 2018-11-26 ENCOUNTER — NURSE ONLY (OUTPATIENT)
Dept: FAMILY MEDICINE CLINIC | Age: 63
End: 2018-11-26
Payer: COMMERCIAL

## 2018-11-26 DIAGNOSIS — E83.52 HYPERCALCEMIA: ICD-10-CM

## 2018-11-26 LAB — PHOSPHORUS: 3.5 MG/DL (ref 2.5–4.9)

## 2018-11-26 PROCEDURE — 36415 COLL VENOUS BLD VENIPUNCTURE: CPT | Performed by: FAMILY MEDICINE

## 2018-11-28 ENCOUNTER — TELEPHONE (OUTPATIENT)
Dept: FAMILY MEDICINE CLINIC | Age: 63
End: 2018-11-28

## 2018-11-29 LAB
RETINYL PALMITATE: <0.02 MG/L (ref 0–0.1)
VITAMIN A LEVEL: 0.67 MG/L (ref 0.3–1.2)
VITAMIN A, INTERP: NORMAL

## 2018-11-30 ENCOUNTER — TELEPHONE (OUTPATIENT)
Dept: FAMILY MEDICINE CLINIC | Age: 63
End: 2018-11-30

## 2018-12-05 ENCOUNTER — TELEPHONE (OUTPATIENT)
Dept: FAMILY MEDICINE CLINIC | Age: 63
End: 2018-12-05

## 2018-12-06 DIAGNOSIS — M81.0 OSTEOPOROSIS: ICD-10-CM

## 2018-12-07 RX ORDER — ALENDRONATE SODIUM 70 MG/1
TABLET ORAL
Qty: 12 TABLET | Refills: 3 | Status: SHIPPED | OUTPATIENT
Start: 2018-12-07 | End: 2020-11-13

## 2019-01-14 DIAGNOSIS — M81.6 LOCALIZED OSTEOPOROSIS WITHOUT CURRENT PATHOLOGICAL FRACTURE: Primary | ICD-10-CM

## 2019-02-15 ENCOUNTER — OFFICE VISIT (OUTPATIENT)
Dept: FAMILY MEDICINE CLINIC | Age: 64
End: 2019-02-15
Payer: COMMERCIAL

## 2019-02-15 VITALS
HEIGHT: 66 IN | WEIGHT: 181 LBS | DIASTOLIC BLOOD PRESSURE: 81 MMHG | BODY MASS INDEX: 29.09 KG/M2 | HEART RATE: 87 BPM | SYSTOLIC BLOOD PRESSURE: 113 MMHG

## 2019-02-15 DIAGNOSIS — R73.03 PREDIABETES: ICD-10-CM

## 2019-02-15 DIAGNOSIS — M81.6 LOCALIZED OSTEOPOROSIS WITHOUT CURRENT PATHOLOGICAL FRACTURE: ICD-10-CM

## 2019-02-15 DIAGNOSIS — I10 ESSENTIAL HYPERTENSION: Primary | ICD-10-CM

## 2019-02-15 DIAGNOSIS — E83.52 HYPERCALCEMIA: ICD-10-CM

## 2019-02-15 DIAGNOSIS — E78.00 PURE HYPERCHOLESTEROLEMIA: ICD-10-CM

## 2019-02-15 LAB
ANION GAP SERPL CALCULATED.3IONS-SCNC: 16 MMOL/L (ref 3–16)
BUN BLDV-MCNC: 20 MG/DL (ref 7–20)
CALCIUM SERPL-MCNC: 9.8 MG/DL (ref 8.3–10.6)
CHLORIDE BLD-SCNC: 99 MMOL/L (ref 99–110)
CHOLESTEROL, TOTAL: 173 MG/DL (ref 0–199)
CO2: 22 MMOL/L (ref 21–32)
CREAT SERPL-MCNC: 1 MG/DL (ref 0.6–1.2)
GFR AFRICAN AMERICAN: >60
GFR NON-AFRICAN AMERICAN: 56
GLUCOSE BLD-MCNC: 90 MG/DL (ref 70–99)
HBA1C MFR BLD: 6.5 %
HDLC SERPL-MCNC: 57 MG/DL (ref 40–60)
LDL CHOLESTEROL CALCULATED: 99 MG/DL
PARATHYROID HORMONE INTACT: 45.8 PG/ML (ref 14–72)
POTASSIUM SERPL-SCNC: 4.1 MMOL/L (ref 3.5–5.1)
SODIUM BLD-SCNC: 137 MMOL/L (ref 136–145)
TRIGL SERPL-MCNC: 87 MG/DL (ref 0–150)
VLDLC SERPL CALC-MCNC: 17 MG/DL

## 2019-02-15 PROCEDURE — 3017F COLORECTAL CA SCREEN DOC REV: CPT | Performed by: FAMILY MEDICINE

## 2019-02-15 PROCEDURE — 83036 HEMOGLOBIN GLYCOSYLATED A1C: CPT | Performed by: FAMILY MEDICINE

## 2019-02-15 PROCEDURE — 1036F TOBACCO NON-USER: CPT | Performed by: FAMILY MEDICINE

## 2019-02-15 PROCEDURE — G8419 CALC BMI OUT NRM PARAM NOF/U: HCPCS | Performed by: FAMILY MEDICINE

## 2019-02-15 PROCEDURE — G8482 FLU IMMUNIZE ORDER/ADMIN: HCPCS | Performed by: FAMILY MEDICINE

## 2019-02-15 PROCEDURE — 99214 OFFICE O/P EST MOD 30 MIN: CPT | Performed by: FAMILY MEDICINE

## 2019-02-15 PROCEDURE — G8427 DOCREV CUR MEDS BY ELIG CLIN: HCPCS | Performed by: FAMILY MEDICINE

## 2019-02-15 RX ORDER — ATORVASTATIN CALCIUM 40 MG/1
40 TABLET, FILM COATED ORAL DAILY
Qty: 90 TABLET | Refills: 1 | Status: SHIPPED | OUTPATIENT
Start: 2019-02-15 | End: 2019-05-17

## 2019-02-15 ASSESSMENT — PATIENT HEALTH QUESTIONNAIRE - PHQ9
SUM OF ALL RESPONSES TO PHQ QUESTIONS 1-9: 0
2. FEELING DOWN, DEPRESSED OR HOPELESS: 0
1. LITTLE INTEREST OR PLEASURE IN DOING THINGS: 0
SUM OF ALL RESPONSES TO PHQ9 QUESTIONS 1 & 2: 0
SUM OF ALL RESPONSES TO PHQ QUESTIONS 1-9: 0

## 2019-02-18 DIAGNOSIS — E78.00 PURE HYPERCHOLESTEROLEMIA: ICD-10-CM

## 2019-02-18 LAB
CALCIUM IONIZED, CALC AT PH 7.4: 1.29 MMOL/L (ref 1.11–1.3)
CALCIUM IONIZED: 1.29 MMOL/L (ref 1.11–1.3)

## 2019-02-19 RX ORDER — ATORVASTATIN CALCIUM 40 MG/1
TABLET, FILM COATED ORAL
Qty: 90 TABLET | Refills: 1 | Status: SHIPPED | OUTPATIENT
Start: 2019-02-19 | End: 2019-08-07 | Stop reason: SDUPTHER

## 2019-05-17 ENCOUNTER — OFFICE VISIT (OUTPATIENT)
Dept: FAMILY MEDICINE CLINIC | Age: 64
End: 2019-05-17
Payer: COMMERCIAL

## 2019-05-17 VITALS
WEIGHT: 181 LBS | HEART RATE: 82 BPM | DIASTOLIC BLOOD PRESSURE: 76 MMHG | BODY MASS INDEX: 29.09 KG/M2 | SYSTOLIC BLOOD PRESSURE: 108 MMHG | HEIGHT: 66 IN

## 2019-05-17 DIAGNOSIS — R73.03 PREDIABETES: Primary | ICD-10-CM

## 2019-05-17 DIAGNOSIS — M81.6 LOCALIZED OSTEOPOROSIS WITHOUT CURRENT PATHOLOGICAL FRACTURE: ICD-10-CM

## 2019-05-17 DIAGNOSIS — E78.00 PURE HYPERCHOLESTEROLEMIA: ICD-10-CM

## 2019-05-17 DIAGNOSIS — I10 ESSENTIAL HYPERTENSION: ICD-10-CM

## 2019-05-17 LAB — HBA1C MFR BLD: 6.2 %

## 2019-05-17 PROCEDURE — 83036 HEMOGLOBIN GLYCOSYLATED A1C: CPT | Performed by: FAMILY MEDICINE

## 2019-05-17 PROCEDURE — G8419 CALC BMI OUT NRM PARAM NOF/U: HCPCS | Performed by: FAMILY MEDICINE

## 2019-05-17 PROCEDURE — G8427 DOCREV CUR MEDS BY ELIG CLIN: HCPCS | Performed by: FAMILY MEDICINE

## 2019-05-17 PROCEDURE — 3017F COLORECTAL CA SCREEN DOC REV: CPT | Performed by: FAMILY MEDICINE

## 2019-05-17 PROCEDURE — 1036F TOBACCO NON-USER: CPT | Performed by: FAMILY MEDICINE

## 2019-05-17 PROCEDURE — 99214 OFFICE O/P EST MOD 30 MIN: CPT | Performed by: FAMILY MEDICINE

## 2019-05-17 NOTE — PROGRESS NOTES
effects: none. Last DXA:  11/2/2018-  T score at spine -1.9 and lowest hip -1.9. Which is improvement in spine, but stable in hip. 10 year fracture risk of major osteoporotic fracture and hip fracture is   calculated at 9.5% and 1.2% respectively. Current calcium intake is at least 1200 mg/day from diet and supplements: yes. She is currently taking 1000 IU/day of supplemental vitamin D. Regular weight-bearing exercise: no. The patient's mother had osteoporosis and broke her hip. Vit D, 25-Hydroxy (ng/mL)   Date Value   10/05/2018 51.1           Vitals:    05/17/19 0954   BP: 108/76   Pulse: 82   Weight: 181 lb (82.1 kg)   Height: 5' 6\" (1.676 m)       Outpatient Medications Marked as Taking for the 5/17/19 encounter (Office Visit) with Maria Esther Posada MD   Medication Sig Dispense Refill    atorvastatin (LIPITOR) 40 MG tablet TAKE 1 TABLET DAILY 90 tablet 1    alendronate (FOSAMAX) 70 MG tablet TAKE 1 TABLET EVERY 7 DAYS 12 tablet 3    lisinopril (PRINIVIL;ZESTRIL) 5 MG tablet Take 1 tablet by mouth daily 90 tablet 1    Ibuprofen (ADVIL PO) Take by mouth      montelukast (SINGULAIR) 10 MG tablet Take 10 mg by mouth nightly.  cetirizine (ZYRTEC) 10 MG tablet Take 10 mg by mouth as needed for Allergies.  mometasone (NASONEX) 50 MCG/ACT nasal spray 2 sprays by Nasal route daily.  vitamin D (CHOLECALCIFEROL) 1000 UNIT TABS tablet Take 1,000 Units by mouth daily.  Omega-3 Fatty Acids (FISH OIL PO) Take  by mouth.  aspirin 81 MG tablet Take 81 mg by mouth daily. Past Medical History:   Diagnosis Date    Allergic rhinitis     saw allergist, dr Yvrose Akhtar of years.   had allergy shots in the past    Chronic kidney disease, stage III (moderate) (HCC)     Hyperlipidemia LDL goal < 130     Hypertension     Osteoporosis     Prediabetes        Past Surgical History:   Procedure Laterality Date    WISDOM TOOTH EXTRACTION         Social History     Tobacco Use    Smoking status: Never Smoker    Smokeless tobacco: Never Used   Substance Use Topics    Alcohol use: Not on file       Family History   Problem Relation Age of Onset    High Blood Pressure Mother     Heart Disease Mother     Cancer Mother 79        breast    High Blood Pressure Father     Heart Disease Father     Heart Disease Paternal Grandfather     Cancer Other         one niece  of breast cancer in 45s, on  of lymphoma of 25s         Review of Systems  See hpi    Objective:   Physical Exam   Constitutional:   · Reviewed vitals above  · Well Nourished, well developed, no distress       Neck:  · Symmetric and without masses  · No thyromegaly  Resp:  · Normal effort  · Clear to auscultation bilaterally without rhonchi, wheezing or crackles  Cardiovascular:  · On auscultation, normal S1 and S2 without murmurs, rubs or gallops  · No bruits of bilateral carotids and no JVD  Gastrointestinal:  · Nontender, nondistended, and no masses  · No hepatosplenomegaly  Musculoskeletal:  · Normal Gait  · All extremities without clubbing, cyanosis or edema  Psych:  · Normal mood and affect  · Normal insight and judgement    FOOT EXAM:    +1 distal tibialis anterior and dorsalis pedis pulses bilaterally  Normal sensation to monofilament testing bilaterally  No calluses  No ulcers  Non mycotic toe nails. Assessment:      See below       Plan:       1. Essential hypertension  At goal.  Continue lisinopril 5mg.   reviewed recent BMP and it was within normal limits. 2. diabetes  Poc A1c =6.2 today. Improvement with lifestyle alone, and is technically now in the prediabetic range. Patient to continue to work on healthy lifestyle. Encouraged to get diabetic eye exam.    Patient to continue baby aspirin, ACE inhibitor, and statin. 3. Hyperlipidemia   Reviewed recent fasting lipid panel and LFTs. Both within normal limits.   Patient to continue Lipitor 40 mg    4.  osteoporosis  Patient to continue Fosamax. Recent DEXA scan patient showed some bone mineral density increase in her spine but stable in her hip.         HM  shingrix encouraged to get at pharmacy    Pap smear UTD, and [facility]    Colonoscopy 4/3/17:  Repeat due 5 years later due to tubular adenoma    Mammogram UTD    F/u 6 months

## 2019-08-07 DIAGNOSIS — E78.00 PURE HYPERCHOLESTEROLEMIA: ICD-10-CM

## 2019-08-07 RX ORDER — ATORVASTATIN CALCIUM 40 MG/1
TABLET, FILM COATED ORAL
Qty: 90 TABLET | Refills: 0 | Status: SHIPPED | OUTPATIENT
Start: 2019-08-07 | End: 2019-10-29 | Stop reason: SDUPTHER

## 2019-09-13 ENCOUNTER — OFFICE VISIT (OUTPATIENT)
Dept: FAMILY MEDICINE CLINIC | Age: 64
End: 2019-09-13
Payer: COMMERCIAL

## 2019-09-13 VITALS
WEIGHT: 180 LBS | HEIGHT: 66 IN | BODY MASS INDEX: 28.93 KG/M2 | SYSTOLIC BLOOD PRESSURE: 102 MMHG | HEART RATE: 83 BPM | DIASTOLIC BLOOD PRESSURE: 76 MMHG

## 2019-09-13 DIAGNOSIS — R42 ORTHOSTATIC LIGHTHEADEDNESS: Primary | ICD-10-CM

## 2019-09-13 DIAGNOSIS — R42 VERTIGO: ICD-10-CM

## 2019-09-13 DIAGNOSIS — R73.03 PREDIABETES: ICD-10-CM

## 2019-09-13 LAB
CHP ED QC CHECK: NORMAL
GLUCOSE BLD-MCNC: 96 MG/DL

## 2019-09-13 PROCEDURE — 99214 OFFICE O/P EST MOD 30 MIN: CPT | Performed by: FAMILY MEDICINE

## 2019-09-13 PROCEDURE — 1036F TOBACCO NON-USER: CPT | Performed by: FAMILY MEDICINE

## 2019-09-13 PROCEDURE — 82962 GLUCOSE BLOOD TEST: CPT | Performed by: FAMILY MEDICINE

## 2019-09-13 PROCEDURE — G8427 DOCREV CUR MEDS BY ELIG CLIN: HCPCS | Performed by: FAMILY MEDICINE

## 2019-09-13 PROCEDURE — 3017F COLORECTAL CA SCREEN DOC REV: CPT | Performed by: FAMILY MEDICINE

## 2019-09-13 PROCEDURE — G8419 CALC BMI OUT NRM PARAM NOF/U: HCPCS | Performed by: FAMILY MEDICINE

## 2019-09-13 RX ORDER — MECLIZINE HYDROCHLORIDE 25 MG/1
25 TABLET ORAL 3 TIMES DAILY PRN
Qty: 30 TABLET | Refills: 0 | Status: SHIPPED | OUTPATIENT
Start: 2019-09-13 | End: 2020-11-13

## 2019-09-13 NOTE — PROGRESS NOTES
PROGRESS NOTE     Izzy Crocker MD  [facility] [address] [place] [address]  [phone] office  [phone] fax    Date of Service:  9/13/2019    Subjective:      Patient ID: Joel Mayen is a 59 y.o. female      CC: sense of imbalance    HPI    40-year-old white female with past medical history of prediabetes, hypertension, hyperlipidemia, presenting with 4 hours of a sense of imbalance. She initially denied feeling lightheaded, but admitted that the sense of imbalance occurred if she went from stooping down to standing up quickly. It started at 5 AM this morning when she was at work. She unloaded a truck, and afterwards was standing next to a computer, when she felt like she was off balance and needed to sit down. She denied feeling like she was spinning or the room was spinning. She states symptoms are little better now, but they are waxing and waning in severity, and she is not sure exactly what makes them worse or better. She has never had symptoms like this before. She denies ear pain/pressure decreased hearing tinnitus upper respiratory symptoms fevers/night sweats visual change headaches nausea/vomiting change in speech cognitive/personality change paresthesias weakness fatigue/lethargy exertional chest pain/pressure dyspnea on exertion palpitations pre-syncope loss of consciousness edema orthopnea/PND diarrhea polydipsia/polyuria involuntary weight loss unexplained weight gain. Recent medication changes:  none. She has been treated with nothing. Previous work up:  none.         Vitals:    09/13/19 0924 09/13/19 0926   BP: 128/84:  Laying down 102/76: standing   Pulse: 75 83   Weight:     Height:         Outpatient Medications Marked as Taking for the 9/13/19 encounter (Office Visit) with Ishaan Vera MD   Medication Sig Dispense Refill    meclizine (ANTIVERT) 25 MG tablet Take 1 tablet by mouth 3 times daily as needed for Dizziness 30 vertigo exercises to do at home, and PRN meclizine. Patient told if symptoms resolve after stopping lisinopril, she does not need to do the vertigo exercises, and should not take the meclizine. Blood sugar was normal at 96. Patient told to follow-up in 2 weeks if symptoms are not improving, sooner if there is any worsening of symptoms. She is aware of strokelike symptoms, and knows to call 911 if they were to occur.

## 2019-10-14 ENCOUNTER — OFFICE VISIT (OUTPATIENT)
Dept: FAMILY MEDICINE CLINIC | Age: 64
End: 2019-10-14
Payer: COMMERCIAL

## 2019-10-14 VITALS
WEIGHT: 182 LBS | DIASTOLIC BLOOD PRESSURE: 84 MMHG | BODY MASS INDEX: 29.38 KG/M2 | HEART RATE: 82 BPM | SYSTOLIC BLOOD PRESSURE: 133 MMHG

## 2019-10-14 DIAGNOSIS — N18.30 CHRONIC KIDNEY DISEASE, STAGE III (MODERATE) (HCC): ICD-10-CM

## 2019-10-14 DIAGNOSIS — E78.00 PURE HYPERCHOLESTEROLEMIA: ICD-10-CM

## 2019-10-14 DIAGNOSIS — E55.9 VITAMIN D DEFICIENCY: ICD-10-CM

## 2019-10-14 DIAGNOSIS — R73.03 PREDIABETES: ICD-10-CM

## 2019-10-14 DIAGNOSIS — M81.6 LOCALIZED OSTEOPOROSIS WITHOUT CURRENT PATHOLOGICAL FRACTURE: Primary | ICD-10-CM

## 2019-10-14 DIAGNOSIS — Z12.39 SCREENING FOR BREAST CANCER: ICD-10-CM

## 2019-10-14 DIAGNOSIS — I10 ESSENTIAL HYPERTENSION: ICD-10-CM

## 2019-10-14 LAB
A/G RATIO: 1.9 (ref 1.1–2.2)
ALBUMIN SERPL-MCNC: 5.2 G/DL (ref 3.4–5)
ALP BLD-CCNC: 71 U/L (ref 40–129)
ALT SERPL-CCNC: 15 U/L (ref 10–40)
ANION GAP SERPL CALCULATED.3IONS-SCNC: 17 MMOL/L (ref 3–16)
AST SERPL-CCNC: 13 U/L (ref 15–37)
BILIRUB SERPL-MCNC: <0.2 MG/DL (ref 0–1)
BUN BLDV-MCNC: 25 MG/DL (ref 7–20)
CALCIUM SERPL-MCNC: 10 MG/DL (ref 8.3–10.6)
CHLORIDE BLD-SCNC: 104 MMOL/L (ref 99–110)
CO2: 19 MMOL/L (ref 21–32)
CREAT SERPL-MCNC: 0.9 MG/DL (ref 0.6–1.2)
GFR AFRICAN AMERICAN: >60
GFR NON-AFRICAN AMERICAN: >60
GLOBULIN: 2.7 G/DL
GLUCOSE BLD-MCNC: 91 MG/DL (ref 70–99)
HBA1C MFR BLD: 6.1 %
POTASSIUM SERPL-SCNC: 4.7 MMOL/L (ref 3.5–5.1)
SODIUM BLD-SCNC: 140 MMOL/L (ref 136–145)
TOTAL PROTEIN: 7.9 G/DL (ref 6.4–8.2)
VITAMIN D 25-HYDROXY: 54.2 NG/ML

## 2019-10-14 PROCEDURE — 36415 COLL VENOUS BLD VENIPUNCTURE: CPT | Performed by: FAMILY MEDICINE

## 2019-10-14 PROCEDURE — 99214 OFFICE O/P EST MOD 30 MIN: CPT | Performed by: FAMILY MEDICINE

## 2019-10-14 PROCEDURE — G8484 FLU IMMUNIZE NO ADMIN: HCPCS | Performed by: FAMILY MEDICINE

## 2019-10-14 PROCEDURE — 83036 HEMOGLOBIN GLYCOSYLATED A1C: CPT | Performed by: FAMILY MEDICINE

## 2019-10-14 PROCEDURE — 3017F COLORECTAL CA SCREEN DOC REV: CPT | Performed by: FAMILY MEDICINE

## 2019-10-14 PROCEDURE — 1036F TOBACCO NON-USER: CPT | Performed by: FAMILY MEDICINE

## 2019-10-14 PROCEDURE — G8427 DOCREV CUR MEDS BY ELIG CLIN: HCPCS | Performed by: FAMILY MEDICINE

## 2019-10-14 PROCEDURE — G8419 CALC BMI OUT NRM PARAM NOF/U: HCPCS | Performed by: FAMILY MEDICINE

## 2019-10-29 DIAGNOSIS — E78.00 PURE HYPERCHOLESTEROLEMIA: ICD-10-CM

## 2019-10-29 RX ORDER — ATORVASTATIN CALCIUM 40 MG/1
TABLET, FILM COATED ORAL
Qty: 90 TABLET | Refills: 0 | Status: SHIPPED | OUTPATIENT
Start: 2019-10-29 | End: 2020-02-03

## 2020-02-03 RX ORDER — ATORVASTATIN CALCIUM 40 MG/1
TABLET, FILM COATED ORAL
Qty: 90 TABLET | Refills: 0 | Status: SHIPPED | OUTPATIENT
Start: 2020-02-03 | End: 2020-05-08 | Stop reason: SDUPTHER

## 2020-02-19 ENCOUNTER — HOSPITAL ENCOUNTER (OUTPATIENT)
Dept: WOMENS IMAGING | Age: 65
Discharge: HOME | End: 2020-02-19
Payer: COMMERCIAL

## 2020-02-19 PROCEDURE — 77063 BREAST TOMOSYNTHESIS BI: CPT

## 2020-05-04 RX ORDER — ATORVASTATIN CALCIUM 40 MG/1
TABLET, FILM COATED ORAL
Qty: 90 TABLET | Refills: 0 | OUTPATIENT
Start: 2020-05-04

## 2020-05-08 ENCOUNTER — VIRTUAL VISIT (OUTPATIENT)
Dept: FAMILY MEDICINE CLINIC | Age: 65
End: 2020-05-08
Payer: COMMERCIAL

## 2020-05-08 PROCEDURE — 99213 OFFICE O/P EST LOW 20 MIN: CPT | Performed by: NURSE PRACTITIONER

## 2020-05-08 RX ORDER — ATORVASTATIN CALCIUM 40 MG/1
TABLET, FILM COATED ORAL
Qty: 90 TABLET | Refills: 1 | Status: SHIPPED | OUTPATIENT
Start: 2020-05-08 | End: 2020-10-14

## 2020-05-08 NOTE — PROGRESS NOTES
2020    TELEHEALTH EVALUATION -- Audio/Visual (During XNNBR-23 public health emergency)    This visit was performed via telephone as both Doxy and Dot did not work on [employer]. HPI:    Flaco Slater (:  1955) has requested an audio/video evaluation for the following concern(s):    HTN, HLD    Treatment Adherence:   Medication compliance:  compliant all of the time  Diet compliance:  compliant most of the time  Weight trend: stable  Current exercise: 8,000-10,000 steps per day  Barriers: none    Hypertension:  Home blood pressure monitoring: No. Patient denies chest pain, shortness of breath, headache, lightheadedness, blurred vision and palpitations. Antihypertensive medication side effects: no medication side effects noted. Use of agents associated with hypertension: none. Sodium (mmol/L)   Date Value   10/14/2019 140    BUN (mg/dL)   Date Value   10/14/2019 25 (H)    Glucose (mg/dL)   Date Value   10/14/2019 91      Potassium (mmol/L)   Date Value   10/14/2019 4.7    CREATININE (mg/dL)   Date Value   10/14/2019 0.9         Hyperlipidemia:  No new myalgias or GI upset on atorvastatin (Lipitor). Lab Results   Component Value Date    CHOL 173 02/15/2019    TRIG 87 02/15/2019    HDL 57 02/15/2019    LDLCALC 99 02/15/2019     Lab Results   Component Value Date    ALT 15 10/14/2019    AST 13 (L) 10/14/2019            Review of Systems    Prior to Visit Medications    Medication Sig Taking? Authorizing Provider   atorvastatin (LIPITOR) 40 MG tablet TAKE 1 TABLET DAILY Yes EDUARDO Frank - CNP   meclizine (ANTIVERT) 25 MG tablet Take 1 tablet by mouth 3 times daily as needed for Dizziness  Srinivasan Martin MD   alendronate (FOSAMAX) 70 MG tablet TAKE 1 TABLET EVERY 7 DAYS  Shu Mclean MD   Ibuprofen (ADVIL PO) Take by mouth  Historical Provider, MD   montelukast (SINGULAIR) 10 MG tablet Take 10 mg by mouth nightly. (During PIRQW-71 public health emergency), evaluation of the following organ systems was limited: Vitals/Constitutional/EENT/Resp/CV/GI//MS/Neuro/Skin/Heme-Lymph-Imm. Pursuant to the emergency declaration under the [facility], [address] [place] [address] authority and the [employer] Act, this Virtual Visit was conducted with patient's (and/or legal guardian's) consent, to reduce the patient's risk of exposure to COVID-19 and provide necessary medical care. The patient (and/or legal guardian) has also been advised to contact this office for worsening conditions or problems, and seek emergency medical treatment and/or call 911 if deemed necessary. Patient identification was verified at the start of the visit: Yes    Total time spent on this encounter: Not billed by time    Services were provided through a video synchronous discussion virtually to substitute for in-person clinic visit. Patient and provider were located at their individual homes. --EDUARDO Torre - CNP on 5/8/2020 at 4:04 PM    An electronic signature was used to authenticate this note.

## 2020-06-25 ENCOUNTER — TELEPHONE (OUTPATIENT)
Dept: FAMILY MEDICINE CLINIC | Age: 65
End: 2020-06-25

## 2020-06-25 NOTE — TELEPHONE ENCOUNTER
Patient is due for lab work and a blood pressure check. The labs are already ordered as future. Please call patient and schedule a nurse visit for labs and blood pressure. Her blood pressure may have been elevated because she was in a lot of pain. We would like to recheck it before restarting medicine. Please document call and then close encounter.   thanks

## 2020-07-01 ENCOUNTER — NURSE ONLY (OUTPATIENT)
Dept: FAMILY MEDICINE CLINIC | Age: 65
End: 2020-07-01
Payer: COMMERCIAL

## 2020-07-01 VITALS — DIASTOLIC BLOOD PRESSURE: 80 MMHG | TEMPERATURE: 97 F | SYSTOLIC BLOOD PRESSURE: 120 MMHG

## 2020-07-01 LAB
A/G RATIO: 1.5 (ref 1.1–2.2)
ALBUMIN SERPL-MCNC: 4.4 G/DL (ref 3.4–5)
ALP BLD-CCNC: 73 U/L (ref 40–129)
ALT SERPL-CCNC: 13 U/L (ref 10–40)
ANION GAP SERPL CALCULATED.3IONS-SCNC: 13 MMOL/L (ref 3–16)
AST SERPL-CCNC: 12 U/L (ref 15–37)
BILIRUB SERPL-MCNC: 0.4 MG/DL (ref 0–1)
BUN BLDV-MCNC: 15 MG/DL (ref 7–20)
CALCIUM SERPL-MCNC: 9.6 MG/DL (ref 8.3–10.6)
CHLORIDE BLD-SCNC: 101 MMOL/L (ref 99–110)
CHOLESTEROL, TOTAL: 178 MG/DL (ref 0–199)
CO2: 23 MMOL/L (ref 21–32)
CREAT SERPL-MCNC: 0.9 MG/DL (ref 0.6–1.2)
GFR AFRICAN AMERICAN: >60
GFR NON-AFRICAN AMERICAN: >60
GLOBULIN: 3 G/DL
GLUCOSE BLD-MCNC: 91 MG/DL (ref 70–99)
HDLC SERPL-MCNC: 53 MG/DL (ref 40–60)
LDL CHOLESTEROL CALCULATED: 102 MG/DL
POTASSIUM SERPL-SCNC: 4 MMOL/L (ref 3.5–5.1)
SODIUM BLD-SCNC: 137 MMOL/L (ref 136–145)
TOTAL PROTEIN: 7.4 G/DL (ref 6.4–8.2)
TRIGL SERPL-MCNC: 113 MG/DL (ref 0–150)
VLDLC SERPL CALC-MCNC: 23 MG/DL

## 2020-07-01 PROCEDURE — 36415 COLL VENOUS BLD VENIPUNCTURE: CPT | Performed by: NURSE PRACTITIONER

## 2020-10-14 RX ORDER — ATORVASTATIN CALCIUM 40 MG/1
TABLET, FILM COATED ORAL
Qty: 30 TABLET | Refills: 0 | Status: SHIPPED | OUTPATIENT
Start: 2020-10-14 | End: 2020-11-13 | Stop reason: SDUPTHER

## 2020-10-14 NOTE — TELEPHONE ENCOUNTER
Inform patient she needs to be seen every 6 months for evaluation of her medical conditions and for blood work. 1 months worth of medicine sent to pharmacy, she will need to schedule appointment to receive more. Needs to be in person, since last appointment was virtual visit, so we can get vitals    . Please document call and then close encounter.   thanks

## 2020-11-12 NOTE — PROGRESS NOTES
Pavan Gutierrez MD  [facility]) Physicians  [place] [address] [address]  [phone] office  [phone] fax      Patient ID: Luisito Gardner is a 72 y.o. female. Date: 11/13/2020        CC:  HTN, HLD, prediabetes,  Osteoporosis      Treatment Adherence:   Medication compliance:  compliant all of the time  Diet compliance:  Most of the time  Weight trend: slight increase  Current exercise: gets about 10,000+ steps at work each day  Barriers: none        Prediabetes: pt denies blurry vision, foot ulcerations, neuropathy, polyphagia, polyuria, polydipsia, nausea, vomiting and diarrhea. Patient states she gets at least 10,000 steps daily. She is trying to monitor her carbohydrate intake. Home blood sugar records: patient does not test  Any episodes of hypoglycemia? no  Tobacco history: She  reports that she has never smoked. She has never used smokeless tobacco.   Daily Aspirin? Yes        Hypertension:   Lisinopril was stopped at last visit after it caused orthostatic hypotension. Patient tries to manage blood pressure by getting exercise and decreasing salt in diet. Patient denies chest pain, shortness of breath, headache, lightheadedness, blurred vision, peripheral edema, palpitations, dry cough and fatigue. Use of agents associated with hypertension: none. Sodium (mmol/L)   Date Value   07/01/2020 137    BUN (mg/dL)   Date Value   07/01/2020 15    Glucose (mg/dL)   Date Value   07/01/2020 91      Potassium (mmol/L)   Date Value   07/01/2020 4.0    CREATININE (mg/dL)   Date Value   07/01/2020 0.9           Hyperlipidemia:  No new myalgias or GI upset on atorvastatin (Lipitor).        Lab Results   Component Value Date    LABA1C 6.2 11/13/2020    LABA1C 6.1 10/14/2019    LABA1C 6.2 05/17/2019     Lab Results   Component Value Date    CREATININE 0.9 07/01/2020     Lab Results   Component Value Date    ALT 13 07/01/2020    AST 12 (L) 07/01/2020     Lab Results   Component Value Date    CHOL 178 07/01/2020    TRIG 113 07/01/2020    HDL 53 07/01/2020    LDLCALC 102 (H) 07/01/2020            Osteoporosis/osteopenia:   Fosamax 70mg was started June 2017. Stopped fosamax in June 2020 due to arthralgias. Arthralgias resolved afterwards. Medication side effects: none. Last DXA:  11/2/2018-  T score at spine -1.9 and lowest hip -1.9. Which is improvement in spine, but stable in hip. 10 year fracture risk of major osteoporotic fracture and hip fracture is   calculated at 9.5% and 1.2% respectively. Current calcium intake is at least 1200 mg/day from diet and supplements: yes. She is currently taking 1000 IU/day of supplemental vitamin D. Regular weight-bearing exercise: no. The patient's mother had osteoporosis and broke her hip. Vit D, 25-Hydroxy (ng/mL)   Date Value   10/14/2019 54.2           Vitals:    11/13/20 1126 11/13/20 1146   BP: (!) 148/90 129/84   Pulse: 80 80   Temp: 97.9 °F (36.6 °C)    TempSrc: Oral    Weight: 186 lb 9.6 oz (84.6 kg)    Height: 5' 6\" (1.676 m)        Outpatient Medications Marked as Taking for the 11/13/20 encounter (Office Visit) with Maria Luisa Ferrari MD   Medication Sig Dispense Refill    atorvastatin (LIPITOR) 40 MG tablet TAKE 1 TABLET DAILY 30 tablet 0    Ibuprofen (ADVIL PO) Take by mouth      montelukast (SINGULAIR) 10 MG tablet Take 10 mg by mouth nightly.  cetirizine (ZYRTEC) 10 MG tablet Take 10 mg by mouth as needed for Allergies.  mometasone (NASONEX) 50 MCG/ACT nasal spray 2 sprays by Nasal route daily.  vitamin D (CHOLECALCIFEROL) 1000 UNIT TABS tablet Take 1,000 Units by mouth daily.  Omega-3 Fatty Acids (FISH OIL PO) Take  by mouth.  aspirin 81 MG tablet Take 81 mg by mouth daily. Past Medical History:   Diagnosis Date    Allergic rhinitis     saw allergist, dr Jeffry Gan of years.   had allergy shots in the past    Hyperlipidemia LDL goal < 130     Hypertension     Osteoporosis     Prediabetes        Past Surgical History:   Procedure Laterality Date    WISDOM TOOTH EXTRACTION         Social History     Tobacco Use    Smoking status: Never Smoker    Smokeless tobacco: Never Used   Substance Use Topics    Alcohol use: Not on file       Family History   Problem Relation Age of Onset    High Blood Pressure Mother     Heart Disease Mother     Cancer Mother 79        breast    High Blood Pressure Father     Heart Disease Father     Heart Disease Paternal Grandfather     Cancer Other         one niece  of breast cancer in 45s, on  of lymphoma of 25s         Review of Systems  Constitutional:  Negative for activity or appetite change, fever or fatigue  HENT:  Negative for congestion, sinus pressure, or rhinorrhea  Eyes:  Negative for eye pain or visual changes  Resp:  Negative for SOB, chest tightness, cough  Cardiovascular: Negative for CP, palpitations, OBREGON, orthopnea, PND, LE edema  Gastrointestinal: Negative for abd pain, melena, BRBPR, N/V/D  Endocrine:  Negative for polydipsia and polyuria  :  Negative for dysuria, flank pain or urinary frequency  Musculoskeletal:  Negative for back pain or myalgias  Neuro:  Negative for dizziness or lightheadedness  Psych: negative for depression or anxiety      Objective:   Physical Exam   Constitutional:   · Reviewed vitals above  · Well Nourished, well developed, no distress       Neck:  · Symmetric and without masses  · No thyromegaly  Resp:  · Normal effort  · Clear to auscultation bilaterally without rhonchi, wheezing or crackles  Cardiovascular:  · On auscultation, normal S1 and S2 without murmurs, rubs or gallops  · No bruits of bilateral carotids and no JVD  Gastrointestinal:  · Nontender, nondistended, and no masses  · No hepatosplenomegaly  Musculoskeletal:  · Normal Gait  · All extremities without clubbing, cyanosis or edema  Psych:  · Normal mood and affect  · Normal insight and judgement        Assessment:      See below       Plan:        Essential hypertension  Only slightly above goal without medication. Patient to continue healthy lifestyle changes. Checking BMP. diabetes with stage 3 CKD  Poc A1c =6.2 today. Patient to continue to work on healthy lifestyle. Encouraged to get diabetic eye exam.    Patient to continue baby aspirin and statin. As stated above could not tolerate ACE inhibitor due to orthostatic hypotension      Hyperlipidemia   Reviewed recent fasting lipid panel and it was WNLs. Patient to continue Lipitor 40 mg    osteoporosis  Did not tolerate Fosamax due to arthralgias. Will repeat DEXA scan and determine if other medication is needed at this time.           Pap smear UTD, normal with neg HPV on 12/20/18    Colonoscopy 4/3/17:  Repeat due 5 years later due to tubular adenoma    Mammogram normal 2/29/20    Flu vaccine already received    [id number] given today with VIS form    shingrix going to check with insurance to see what cost is    F/u 6 months

## 2020-11-13 ENCOUNTER — OFFICE VISIT (OUTPATIENT)
Dept: FAMILY MEDICINE CLINIC | Age: 65
End: 2020-11-13
Payer: COMMERCIAL

## 2020-11-13 VITALS
WEIGHT: 186.6 LBS | SYSTOLIC BLOOD PRESSURE: 129 MMHG | BODY MASS INDEX: 29.99 KG/M2 | DIASTOLIC BLOOD PRESSURE: 84 MMHG | HEIGHT: 66 IN | HEART RATE: 80 BPM | TEMPERATURE: 97.9 F

## 2020-11-13 LAB
ANION GAP SERPL CALCULATED.3IONS-SCNC: 11 MMOL/L (ref 3–16)
BUN BLDV-MCNC: 26 MG/DL (ref 7–20)
CALCIUM SERPL-MCNC: 9.8 MG/DL (ref 8.3–10.6)
CHLORIDE BLD-SCNC: 104 MMOL/L (ref 99–110)
CO2: 23 MMOL/L (ref 21–32)
CREAT SERPL-MCNC: 0.8 MG/DL (ref 0.6–1.2)
GFR AFRICAN AMERICAN: >60
GFR NON-AFRICAN AMERICAN: >60
GLUCOSE BLD-MCNC: 90 MG/DL (ref 70–99)
HBA1C MFR BLD: 6.2 %
POTASSIUM SERPL-SCNC: 4.5 MMOL/L (ref 3.5–5.1)
SODIUM BLD-SCNC: 138 MMOL/L (ref 136–145)
VITAMIN D 25-HYDROXY: 52.6 NG/ML

## 2020-11-13 PROCEDURE — 36415 COLL VENOUS BLD VENIPUNCTURE: CPT | Performed by: FAMILY MEDICINE

## 2020-11-13 PROCEDURE — 3017F COLORECTAL CA SCREEN DOC REV: CPT | Performed by: FAMILY MEDICINE

## 2020-11-13 PROCEDURE — 1090F PRES/ABSN URINE INCON ASSESS: CPT | Performed by: FAMILY MEDICINE

## 2020-11-13 PROCEDURE — G8427 DOCREV CUR MEDS BY ELIG CLIN: HCPCS | Performed by: FAMILY MEDICINE

## 2020-11-13 PROCEDURE — 1036F TOBACCO NON-USER: CPT | Performed by: FAMILY MEDICINE

## 2020-11-13 PROCEDURE — 4040F PNEUMOC VAC/ADMIN/RCVD: CPT | Performed by: FAMILY MEDICINE

## 2020-11-13 PROCEDURE — G8399 PT W/DXA RESULTS DOCUMENT: HCPCS | Performed by: FAMILY MEDICINE

## 2020-11-13 PROCEDURE — 90732 PPSV23 VACC 2 YRS+ SUBQ/IM: CPT | Performed by: FAMILY MEDICINE

## 2020-11-13 PROCEDURE — G8417 CALC BMI ABV UP PARAM F/U: HCPCS | Performed by: FAMILY MEDICINE

## 2020-11-13 PROCEDURE — G8484 FLU IMMUNIZE NO ADMIN: HCPCS | Performed by: FAMILY MEDICINE

## 2020-11-13 PROCEDURE — 1123F ACP DISCUSS/DSCN MKR DOCD: CPT | Performed by: FAMILY MEDICINE

## 2020-11-13 PROCEDURE — 99214 OFFICE O/P EST MOD 30 MIN: CPT | Performed by: FAMILY MEDICINE

## 2020-11-13 PROCEDURE — 90471 IMMUNIZATION ADMIN: CPT | Performed by: FAMILY MEDICINE

## 2020-11-13 PROCEDURE — 83036 HEMOGLOBIN GLYCOSYLATED A1C: CPT | Performed by: FAMILY MEDICINE

## 2020-11-13 RX ORDER — ATORVASTATIN CALCIUM 40 MG/1
40 TABLET, FILM COATED ORAL DAILY
Qty: 90 TABLET | Refills: 1 | Status: SHIPPED | OUTPATIENT
Start: 2020-11-13 | End: 2021-06-01 | Stop reason: SDUPTHER

## 2020-11-13 ASSESSMENT — PATIENT HEALTH QUESTIONNAIRE - PHQ9
SUM OF ALL RESPONSES TO PHQ9 QUESTIONS 1 & 2: 0
SUM OF ALL RESPONSES TO PHQ QUESTIONS 1-9: 0
SUM OF ALL RESPONSES TO PHQ QUESTIONS 1-9: 0
1. LITTLE INTEREST OR PLEASURE IN DOING THINGS: 0
2. FEELING DOWN, DEPRESSED OR HOPELESS: 0
SUM OF ALL RESPONSES TO PHQ QUESTIONS 1-9: 0

## 2020-11-24 ENCOUNTER — VIRTUAL VISIT (OUTPATIENT)
Dept: FAMILY MEDICINE CLINIC | Age: 65
End: 2020-11-24
Payer: COMMERCIAL

## 2020-11-24 PROCEDURE — 99213 OFFICE O/P EST LOW 20 MIN: CPT | Performed by: NURSE PRACTITIONER

## 2020-11-24 NOTE — PROGRESS NOTES
2020    TELEHEALTH EVALUATION -- Audio/Visual (During AWERU-08 public health emergency)    HPI:    Tadeo Hadley (:  1955) has requested an audio/video evaluation for the following concern(s):    URI symptoms    Respiratory Symptoms:  Patient complains of 3 day(s) history of nasal congestion, sore throat and non-productive cough. Symptoms have been unchanged with time. She denies fever, headache, ear symptoms, shortness of breath, wheezing/chest tightness, nausea/vomiting and diarrhea. Relevant PMH: No pertinent PMH. Smoking history:  She  reports that she has never smoked. She has never used smokeless tobacco. She has had no known ill contacts. Treatment to date: antihistamines. Review of Systems    Prior to Visit Medications    Medication Sig Taking? Authorizing Provider   atorvastatin (LIPITOR) 40 MG tablet Take 1 tablet by mouth daily Yes Naren Anderson MD   Ibuprofen (ADVIL PO) Take by mouth Yes Historical Provider, MD   montelukast (SINGULAIR) 10 MG tablet Take 10 mg by mouth nightly. Yes Historical Provider, MD   cetirizine (ZYRTEC) 10 MG tablet Take 10 mg by mouth as needed for Allergies. Yes Historical Provider, MD   mometasone (NASONEX) 50 MCG/ACT nasal spray 2 sprays by Nasal route daily. Yes Historical Provider, MD   vitamin D (CHOLECALCIFEROL) 1000 UNIT TABS tablet Take 1,000 Units by mouth daily. Yes Historical Provider, MD   Omega-3 Fatty Acids (FISH OIL PO) Take  by mouth. Yes Historical Provider, MD   aspirin 81 MG tablet Take 81 mg by mouth daily.  Yes Historical Provider, MD       Social History     Tobacco Use    Smoking status: Never Smoker    Smokeless tobacco: Never Used   Substance Use Topics    Alcohol use: Not on file    Drug use: Not on file            PHYSICAL EXAMINATION:  [ INSTRUCTIONS:  \"[x]\" Indicates a positive item  \"[]\" Indicates a negative item  -- DELETE ALL ITEMS NOT EXAMINED]  Vital Signs: (As obtained by patient/caregiver or practitioner observation)    Blood pressure-  Heart rate-    Respiratory rate-    Temperature-  Pulse oximetry-     Constitutional: [x] Appears well-developed and well-nourished [x] No apparent distress      [] Abnormal-   Mental status  [x] Alert and awake  [x] Oriented to person/place/time [x]Able to follow commands      Eyes:  EOM    [x]  Normal  [] Abnormal-  Sclera  [x]  Normal  [] Abnormal -         Discharge [x]  None visible  [] Abnormal -    HENT:   [x] Normocephalic, atraumatic. [] Abnormal   [x] Mouth/Throat: Mucous membranes are moist.     External Ears [x] Normal  [] Abnormal-     Neck: [x] No visualized mass     Pulmonary/Chest: [x] Respiratory effort normal.  [x] No visualized signs of difficulty breathing or respiratory distress        [] Abnormal-       Neurological:        [x] No Facial Asymmetry (Cranial nerve 7 motor function) (limited exam to video visit)          [x] No gaze palsy        [] Abnormal-         Skin:        [x] No significant exanthematous lesions or discoloration noted on facial skin         [] Abnormal-            Psychiatric:       [x] Normal Affect [x] No Hallucinations        [] Abnormal-     Other pertinent observable physical exam findings-     ASSESSMENT/PLAN:  1. Viral URI  No obvious signs of bacterial infection. Patient concerned she may have sinus infection. Explained to patient most sinus infections are viral and usually resolve on their own. Did advise patient to get covid testing done as we have seen a lot of positives with her above symptoms. Number given for her to schedule. Notify office if symptoms do not improve in the next 2-3 days. No follow-ups on file. Liliya Stevens is a 72 y.o. female being evaluated by a Virtual Visit (video visit) encounter to address concerns as mentioned above. A caregiver was present when appropriate.  Due to this being a TeleHealth encounter (During WAYPS-00 public health emergency), evaluation of the following organ systems was limited: Vitals/Constitutional/EENT/Resp/CV/GI//MS/Neuro/Skin/Heme-Lymph-Imm. Pursuant to the emergency declaration under the [address] [place] [address] and the [employer] Act, this Virtual Visit was conducted with patient's (and/or legal guardian's) consent, to reduce the patient's risk of exposure to COVID-19 and provide necessary medical care. The patient (and/or legal guardian) has also been advised to contact this office for worsening conditions or problems, and seek emergency medical treatment and/or call 911 if deemed necessary. Patient identification was verified at the start of the visit: Yes    Total time spent on this encounter: Not billed by time    Services were provided through a video synchronous discussion virtually to substitute for in-person clinic visit. Patient and provider were located at their individual homes. --EDUARDO Anne CNP on 11/24/2020 at 9:53 PM    An electronic signature was used to authenticate this note.

## 2020-11-27 ENCOUNTER — OFFICE VISIT (OUTPATIENT)
Dept: PRIMARY CARE CLINIC | Age: 65
End: 2020-11-27
Payer: COMMERCIAL

## 2020-11-27 PROCEDURE — G8428 CUR MEDS NOT DOCUMENT: HCPCS | Performed by: NURSE PRACTITIONER

## 2020-11-27 PROCEDURE — G8417 CALC BMI ABV UP PARAM F/U: HCPCS | Performed by: NURSE PRACTITIONER

## 2020-11-27 PROCEDURE — 99211 OFF/OP EST MAY X REQ PHY/QHP: CPT | Performed by: NURSE PRACTITIONER

## 2020-11-27 NOTE — PROGRESS NOTES
Liliya Stevens received a viral test for COVID-19. They were educated on isolation and quarantine as appropriate. For any symptoms, they were directed to seek care from their PCP, given contact information to establish with a doctor, directed to an urgent care or the emergency room.

## 2020-11-28 LAB — SARS-COV-2, NAA: NOT DETECTED

## 2020-11-30 ENCOUNTER — TELEPHONE (OUTPATIENT)
Dept: FAMILY MEDICINE CLINIC | Age: 65
End: 2020-11-30

## 2020-11-30 NOTE — TELEPHONE ENCOUNTER
PT called in stating that she got tested for COVID on Friday and her results came back negative and she has proof of negative results but she also needs a letter from Dr. Aleyda Hunt releasing patient back to work to give to her employer. Please call PT back: [phone].

## 2020-11-30 NOTE — LETTER
NOTIFICATION RETURN TO WORK / SCHOOL    11/30/2020    Ms. Love Keys Eppert  [address]  [address]  [facility] [address]      To Whom It May Concern:    Hiren Durbin was tested for COVID-19 on 11/27, and the result was negative. She may return to work tomorrow. I recommend:return without restrictions    If there are questions or concerns, please have the patient contact our office.         Sincerely,      Nasir Liang MD

## 2020-11-30 NOTE — TELEPHONE ENCOUNTER
Patient know that it is okay to go back as long as she is no longer having any symptoms. Please confirm this first.  As long as is okay, release her to go back tomorrow via letter. Please document call and then close encounter.   thanks

## 2020-11-30 NOTE — TELEPHONE ENCOUNTER
PT called in regarding her previous message. PT says that she is no longer experiencing any symptoms/no longer has symptoms.

## 2021-03-04 ENCOUNTER — HOSPITAL ENCOUNTER (OUTPATIENT)
Dept: WOMENS IMAGING | Age: 66
Discharge: HOME | End: 2021-03-04
Payer: COMMERCIAL

## 2021-03-04 DIAGNOSIS — Z12.31 VISIT FOR SCREENING MAMMOGRAM: ICD-10-CM

## 2021-03-04 PROCEDURE — 77063 BREAST TOMOSYNTHESIS BI: CPT

## 2021-03-15 ENCOUNTER — IMMUNIZATION (OUTPATIENT)
Dept: PRIMARY CARE CLINIC | Age: 66
End: 2021-03-15
Payer: COMMERCIAL

## 2021-03-15 PROCEDURE — 91300 COVID-19, PFIZER VACCINE 30MCG/0.3ML DOSE: CPT | Performed by: FAMILY MEDICINE

## 2021-03-15 PROCEDURE — 0001A COVID-19, PFIZER VACCINE 30MCG/0.3ML DOSE: CPT | Performed by: FAMILY MEDICINE

## 2021-04-12 ENCOUNTER — IMMUNIZATION (OUTPATIENT)
Dept: PRIMARY CARE CLINIC | Age: 66
End: 2021-04-12
Payer: COMMERCIAL

## 2021-04-12 PROCEDURE — 0002A COVID-19, PFIZER VACCINE 30MCG/0.3ML DOSE: CPT | Performed by: FAMILY MEDICINE

## 2021-04-12 PROCEDURE — 91300 COVID-19, PFIZER VACCINE 30MCG/0.3ML DOSE: CPT | Performed by: FAMILY MEDICINE

## 2021-06-01 ENCOUNTER — OFFICE VISIT (OUTPATIENT)
Dept: FAMILY MEDICINE CLINIC | Age: 66
End: 2021-06-01
Payer: COMMERCIAL

## 2021-06-01 VITALS
HEART RATE: 82 BPM | DIASTOLIC BLOOD PRESSURE: 82 MMHG | SYSTOLIC BLOOD PRESSURE: 126 MMHG | OXYGEN SATURATION: 98 % | WEIGHT: 177.4 LBS | HEIGHT: 66 IN | BODY MASS INDEX: 28.51 KG/M2

## 2021-06-01 DIAGNOSIS — Z80.3 FH: BREAST CANCER: ICD-10-CM

## 2021-06-01 DIAGNOSIS — Z86.010 HISTORY OF COLON POLYPS: ICD-10-CM

## 2021-06-01 DIAGNOSIS — E78.00 PURE HYPERCHOLESTEROLEMIA: ICD-10-CM

## 2021-06-01 DIAGNOSIS — J30.9 ALLERGIC RHINITIS, UNSPECIFIED SEASONALITY, UNSPECIFIED TRIGGER: ICD-10-CM

## 2021-06-01 DIAGNOSIS — M81.6 LOCALIZED OSTEOPOROSIS WITHOUT CURRENT PATHOLOGICAL FRACTURE: ICD-10-CM

## 2021-06-01 DIAGNOSIS — R73.03 PREDIABETES: Primary | ICD-10-CM

## 2021-06-01 LAB — HBA1C MFR BLD: 6.2 %

## 2021-06-01 PROCEDURE — 1123F ACP DISCUSS/DSCN MKR DOCD: CPT | Performed by: INTERNAL MEDICINE

## 2021-06-01 PROCEDURE — G8399 PT W/DXA RESULTS DOCUMENT: HCPCS | Performed by: INTERNAL MEDICINE

## 2021-06-01 PROCEDURE — G8417 CALC BMI ABV UP PARAM F/U: HCPCS | Performed by: INTERNAL MEDICINE

## 2021-06-01 PROCEDURE — 99214 OFFICE O/P EST MOD 30 MIN: CPT | Performed by: INTERNAL MEDICINE

## 2021-06-01 PROCEDURE — 36415 COLL VENOUS BLD VENIPUNCTURE: CPT | Performed by: INTERNAL MEDICINE

## 2021-06-01 PROCEDURE — 83036 HEMOGLOBIN GLYCOSYLATED A1C: CPT | Performed by: INTERNAL MEDICINE

## 2021-06-01 PROCEDURE — 4040F PNEUMOC VAC/ADMIN/RCVD: CPT | Performed by: INTERNAL MEDICINE

## 2021-06-01 PROCEDURE — 1036F TOBACCO NON-USER: CPT | Performed by: INTERNAL MEDICINE

## 2021-06-01 PROCEDURE — 3017F COLORECTAL CA SCREEN DOC REV: CPT | Performed by: INTERNAL MEDICINE

## 2021-06-01 PROCEDURE — 1090F PRES/ABSN URINE INCON ASSESS: CPT | Performed by: INTERNAL MEDICINE

## 2021-06-01 PROCEDURE — G8427 DOCREV CUR MEDS BY ELIG CLIN: HCPCS | Performed by: INTERNAL MEDICINE

## 2021-06-01 RX ORDER — FLUTICASONE PROPIONATE 50 MCG
2 SPRAY, SUSPENSION (ML) NASAL DAILY
COMMUNITY
Start: 2020-08-24 | End: 2022-05-31 | Stop reason: SDUPTHER

## 2021-06-01 RX ORDER — ATORVASTATIN CALCIUM 40 MG/1
40 TABLET, FILM COATED ORAL DAILY
Qty: 90 TABLET | Refills: 3 | Status: SHIPPED | OUTPATIENT
Start: 2021-06-01 | End: 2022-05-31 | Stop reason: SDUPTHER

## 2021-06-01 RX ORDER — MONTELUKAST SODIUM 10 MG/1
10 TABLET ORAL NIGHTLY
Qty: 90 TABLET | Refills: 3 | Status: SHIPPED | OUTPATIENT
Start: 2021-06-01 | End: 2022-05-31 | Stop reason: SDUPTHER

## 2021-06-01 ASSESSMENT — PATIENT HEALTH QUESTIONNAIRE - PHQ9
1. LITTLE INTEREST OR PLEASURE IN DOING THINGS: 0
SUM OF ALL RESPONSES TO PHQ9 QUESTIONS 1 & 2: 0
SUM OF ALL RESPONSES TO PHQ QUESTIONS 1-9: 0
2. FEELING DOWN, DEPRESSED OR HOPELESS: 0
SUM OF ALL RESPONSES TO PHQ QUESTIONS 1-9: 0
SUM OF ALL RESPONSES TO PHQ QUESTIONS 1-9: 0

## 2021-06-01 NOTE — PATIENT INSTRUCTIONS
Call for bone density and follow-up colonoscopy. Continue with nice exercise low-carb diet and weight loss. Consider following with the breast specialist regarding your family history of breast cancer. Continue on with your Singulair. You may use your other allergy medicines with this as needed. Compression stocking for your varicose veins. You may certainly follow-up with a vein doctor if having pain or uncomfortable swelling. Complete your power of  and living will. Set up your shingles vaccine at the local clinic. Patient Education        Well Visit, Over 72: Care Instructions  Overview     Well visits can help you stay healthy. Your doctor has checked your overall health and may have suggested ways to take good care of yourself. Your doctor also may have recommended tests. At home, you can help prevent illness with healthy eating, regular exercise, and other steps. Follow-up care is a key part of your treatment and safety. Be sure to make and go to all appointments, and call your doctor if you are having problems. It's also a good idea to know your test results and keep a list of the medicines you take. How can you care for yourself at home? · Get screening tests that you and your doctor decide on. Screening helps find diseases before any symptoms appear. · Eat healthy foods. Choose fruits, vegetables, whole grains, protein, and low-fat dairy foods. Limit fat, especially saturated fat. Reduce salt in your diet. · Limit alcohol. If you are a man, have no more than 2 drinks a day or 14 drinks a week. If you are a woman, have no more than 1 drink a day or 7 drinks a week. Since alcohol affects older adults differently, you may want to limit alcohol even more. Or you may not want to drink at all. · Get at least 30 minutes of exercise on most days of the week. Walking is a good choice.  You also may want to do other activities, such as running, swimming, cycling, or playing tennis or team sports. · Reach and stay at a healthy weight. This will lower your risk for many problems, such as obesity, diabetes, heart disease, and high blood pressure. · Do not smoke. Smoking can make health problems worse. If you need help quitting, talk to your doctor about stop-smoking programs and medicines. These can increase your chances of quitting for good. · Care for your mental health. It is easy to get weighed down by worry and stress. Learn strategies to manage stress, like deep breathing and mindfulness, and stay connected with your family and community. If you find you often feel sad or hopeless, talk with your doctor. Treatment can help. · Talk to your doctor about whether you have any risk factors for sexually transmitted infections (STIs). You can help prevent STIs if you wait to have sex with a new partner (or partners) until you've each been tested for STIs. It also helps if you use condoms (male or female condoms) and if you limit your sex partners to one person who only has sex with you. Vaccines are available for some STIs. · If you think you may have a problem with alcohol or drug use, talk to your doctor. This includes prescription medicines (such as amphetamines and opioids) and illegal drugs (such as cocaine and methamphetamine). Your doctor can help you figure out what type of treatment is best for you. · Protect your skin from too much sun. When you're outdoors from 10 a.m. to 4 p.m., stay in the shade or cover up with clothing and a hat with a wide brim. Wear sunglasses that block UV rays. Even when it's cloudy, put broad-spectrum sunscreen (SPF 30 or higher) on any exposed skin. · See a dentist one or two times a year for checkups and to have your teeth cleaned. · Wear a seat belt in the car. When should you call for help? Watch closely for changes in your health, and be sure to contact your doctor if you have any problems or symptoms that concern you. Where can you learn more?   Go to [email or web address] and sign in to your [employer] account. Enter E522 in the [facility] box to learn more about \"Well Visit, Over 65: Care Instructions. \"     If you do not have an account, please click on the \"Sign Up Now\" link. Current as of: May 27, 2020               Content Version: 12.8  © 2006-2021 [employer], [facility]. Care instructions adapted under license by [facility] ([facility]). If you have questions about a medical condition or this instruction, always ask your healthcare professional. [address] [address] any warranty or liability for your use of this information.

## 2021-06-01 NOTE — PROGRESS NOTES
HPI: Tamie Linares presents to establish care. Chronic health issues include prediabetes, history hypertension, osteoporosis, family history breast cancer, tubular adenoma, rhinitis,    Prediabetes. A1c of 6.2. Has lost 9 pounds. Walking at work and sometimes after the park if she does not get her 10,000 steps then. Wears glasses. Following low-carb diet. Up-to-date eye exams. No paresthesias. History hypertension. Not checking blood pressure. Using diet and exercise. No chest pain palpitations lower extremity edema or increased exercise intolerance. Family history of valve disease in both parents with replacement and sibling with myocardial infarction. She is on atorvastatin. No claudication or TIAs. Osteoporosis 2018. Bone pain with Fosamax. Minimal alcohol. No tobacco.  Positive family history osteoporosis. Has a history of stress fracture. Using vitamin D at 1000 units with last vitamin D being in a good range. Hx tubular adenoma and to repeat this year. Rhinitis. Hx or allergy shots. Lost sense of smell. No longer immunotherapy. Using montelukast with good results. Occasional H1 blocker. None and no spray     . No gestational DM or toxemia. Has GYN. Amenorrhic. Sexually active. . Strong family history of breast cancer mother sister niece. BI-RADS 1 mammogram 2021. Does her own exams. Not interested in breast surgeon or specialist at this time. Specialists    Dr Nkechi Carrillo    Dentist        PMH:   wislara tooth    SH lives alone. . , [place]. No tobacco. Rare tobacco. No drugs. No current partners. Not planning    FH:    + breast cancer mom, sister and niece, brother, [de-identified] yo valve replacement, brother cad    - DM      Review of systems: Chronic sinus symptoms glasses. No hearing difficulties no falls. No concussions or violence.   Denies any wheezing pneumonias shortness of breath no chest pain palpitations or syncope. No breast lumps. Does her exams. Rare GE reflux. No bloody stools or black stools. No GI or urinary concerns. Occasional arthralgias. No falls. Follows with a dermatologist        Constitutional, ent, CV, respiratory, GI, , joint, skin, allergic and psychiatric ROS reviewed and negative except for above    No Known Allergies    No outpatient medications have been marked as taking for the 21 encounter (Appointment) with Zeb Correa MD.             Past Medical History:   Diagnosis Date    Allergic rhinitis     saw allergist, dr Almonte Brochure of years. had allergy shots in the past    Hyperlipidemia LDL goal < 130     Hypertension     Osteoporosis     Prediabetes        Past Surgical History:   Procedure Laterality Date    WISDOM TOOTH EXTRACTION               Family History   Problem Relation Age of Onset    High Blood Pressure Mother     Heart Disease Mother     Cancer Mother 79        breast    High Blood Pressure Father     Heart Disease Father     Heart Disease Paternal Grandfather     Cancer Other         one niece  of breast cancer in 45s, on  of lymphoma of 25s                 Objective     /82   Pulse 82   Ht 5' 6\" (1.676 m)   Wt 177 lb 6.4 oz (80.5 kg)   SpO2 98%   BMI 28.63 kg/m²     @LASTSAO2(3)@    Wt Readings from Last 3 Encounters:   20 186 lb 9.6 oz (84.6 kg)   10/14/19 182 lb (82.6 kg)   19 180 lb (81.6 kg)       Physical Exam     NAD alert and cooperative  HEENT: Fair dentition. Throat is clear. Macule bridge of nose ear regular. Some nodules. Nasal area. No nasal obstruction. Good upstroke of the carotids no bruits. Lungs are clear. Good AKNUR ratio without any wheezes rales rhonchi  Cardiovascular exam regular rate and rhythm no murmur click. No gallop. Breast no dominant mass or discharge left nipple is inverted as always. Abdomen is benign no hepatosplenomegaly epigastric tenderness or mass.   Good range Diabetes doing well continue on with her diet exercise weight loss. Hyperlipidemia family history of heart disease. Lipid profile liver function today. Osteoporosis. Repeat bone density vitamin D. Information on preventing osteoporosis given. Allergic rhinitis continue on with current medications. Family history of breast cancer. Keep up with mammograms consider genetic testing. History of colonic polyps. Referral for follow-up colonoscopy. Allergic rhinitis. Continue on with her current medications        No follow-ups on file. Diagnosis and treatment discussed.   Possible side effects of medication reviewed  Patients questions answered  Follow up understood  Pt aware if they are not contacted about any test results , this does not mean they are normal.  They should call

## 2021-06-02 LAB
A/G RATIO: 1.3 (ref 1.1–2.2)
ALBUMIN SERPL-MCNC: 4.5 G/DL (ref 3.4–5)
ALP BLD-CCNC: 102 U/L (ref 40–129)
ALT SERPL-CCNC: 12 U/L (ref 10–40)
ANION GAP SERPL CALCULATED.3IONS-SCNC: 14 MMOL/L (ref 3–16)
AST SERPL-CCNC: 14 U/L (ref 15–37)
BILIRUB SERPL-MCNC: 0.4 MG/DL (ref 0–1)
BUN BLDV-MCNC: 15 MG/DL (ref 7–20)
CALCIUM SERPL-MCNC: 10.6 MG/DL (ref 8.3–10.6)
CHLORIDE BLD-SCNC: 100 MMOL/L (ref 99–110)
CHOLESTEROL, TOTAL: 178 MG/DL (ref 0–199)
CO2: 22 MMOL/L (ref 21–32)
CREAT SERPL-MCNC: 0.9 MG/DL (ref 0.6–1.2)
GFR AFRICAN AMERICAN: >60
GFR NON-AFRICAN AMERICAN: >60
GLOBULIN: 3.4 G/DL
GLUCOSE BLD-MCNC: 93 MG/DL (ref 70–99)
HDLC SERPL-MCNC: 51 MG/DL (ref 40–60)
LDL CHOLESTEROL CALCULATED: 102 MG/DL
POTASSIUM SERPL-SCNC: 4.1 MMOL/L (ref 3.5–5.1)
SODIUM BLD-SCNC: 136 MMOL/L (ref 136–145)
TOTAL PROTEIN: 7.9 G/DL (ref 6.4–8.2)
TRIGL SERPL-MCNC: 124 MG/DL (ref 0–150)
VITAMIN D 25-HYDROXY: 52.7 NG/ML
VLDLC SERPL CALC-MCNC: 25 MG/DL

## 2022-01-05 ENCOUNTER — TELEPHONE (OUTPATIENT)
Dept: FAMILY MEDICINE CLINIC | Age: 67
End: 2022-01-05

## 2022-01-05 DIAGNOSIS — Z80.3 FH: BREAST CANCER: ICD-10-CM

## 2022-01-05 DIAGNOSIS — N63.20 BREAST MASS, LEFT: Primary | ICD-10-CM

## 2022-01-06 ENCOUNTER — TELEPHONE (OUTPATIENT)
Dept: FAMILY MEDICINE CLINIC | Age: 67
End: 2022-01-06

## 2022-01-06 PROBLEM — N63.0 BREAST MASS IN FEMALE: Status: ACTIVE | Noted: 2022-01-06

## 2022-01-06 NOTE — TELEPHONE ENCOUNTER
Just noticed monday morning. Bottom center of left breast.    States that she has a family history of breast cancer. Mother, niece, sister.

## 2022-01-13 ENCOUNTER — HOSPITAL ENCOUNTER (OUTPATIENT)
Dept: ULTRASOUND IMAGING | Age: 67
Discharge: HOME | End: 2022-01-13
Payer: COMMERCIAL

## 2022-01-13 ENCOUNTER — HOSPITAL ENCOUNTER (OUTPATIENT)
Dept: WOMENS IMAGING | Age: 67
Discharge: HOME | End: 2022-01-13
Payer: COMMERCIAL

## 2022-01-13 VITALS — BODY MASS INDEX: 28.93 KG/M2 | WEIGHT: 180 LBS | HEIGHT: 66 IN

## 2022-01-13 DIAGNOSIS — N63.20 BREAST MASS, LEFT: ICD-10-CM

## 2022-01-13 DIAGNOSIS — Z80.3 FH: BREAST CANCER: ICD-10-CM

## 2022-01-13 DIAGNOSIS — N63.0 BREAST MASS: Primary | ICD-10-CM

## 2022-01-13 PROCEDURE — 76641 ULTRASOUND BREAST COMPLETE: CPT

## 2022-01-13 PROCEDURE — G0279 TOMOSYNTHESIS, MAMMO: HCPCS

## 2022-02-15 ENCOUNTER — TELEPHONE (OUTPATIENT)
Dept: SURGERY | Age: 67
End: 2022-02-15

## 2022-02-15 NOTE — TELEPHONE ENCOUNTER
Called patient back and reviewed new patient intake form and confirmed again her appointment for tomorrow.

## 2022-02-15 NOTE — TELEPHONE ENCOUNTER
Spoke to patient and confirmed appointment for tomorrow at our [place] location and patient was unable to review new patient intake at this time due to she is at work. States if I call back around 3 pm, it would be better for her.

## 2022-02-15 NOTE — PROGRESS NOTES
Menstrual History:  Menarche age: 12  . Age first live birth: 23  Breastfeed: no  Postmenopausal  Natural menopause    Oral contraceptive use: yes for about 15 years and is not still using  Hormone replacement therapy use: no     Breast density: Heterogeneously dense   [employer] Heritage: no   Genetic testing: the patient has not but her sister Refugio Farmer) has and was negative    Family history significant for breast and ovarian cancer: Mother Breast Cancer, age of dx [de-identified]  at 80  Sister Refugio Farmer) Breast Cancer, age of dx 70  Niece (Josef, daughter of Jefry Yoon) Breast Cancer, age of dx 36  at 37  Niece had a tumor on her neck at age 15 then Lung Cancer at 23  at 25  Brother Oly Lao 25, age of dx ???      Lifetime risk for breast cancer 16.4%      Diet: regular, tries to eat a healthy diet and avoids carbs and fatty foods  Alcohol: rarely  Exercise: walks 10,000+ steps daily  Sleep: about 8 hours a night  Caffeine: has one cup of coffee every am    Review of Systems   Constitutional: Negative for unexpected weight change. Eyes: Negative for visual disturbance. Respiratory: Negative for cough and shortness of breath. Cardiovascular: Negative for chest pain and palpitations. Gastrointestinal: Negative for abdominal pain. Musculoskeletal: Negative for arthralgias and myalgias. Neurological: Negative for headaches. Hematological: Negative for adenopathy. Does not bruise/bleed easily. Psychiatric/Behavioral: Negative for dysphoric mood. The patient is not nervous/anxious.

## 2022-02-16 ENCOUNTER — OFFICE VISIT (OUTPATIENT)
Dept: BREAST CENTER | Age: 67
End: 2022-02-16
Payer: COMMERCIAL

## 2022-02-16 VITALS
DIASTOLIC BLOOD PRESSURE: 74 MMHG | RESPIRATION RATE: 18 BRPM | BODY MASS INDEX: 30.08 KG/M2 | OXYGEN SATURATION: 98 % | HEIGHT: 66 IN | WEIGHT: 187.2 LBS | HEART RATE: 98 BPM | SYSTOLIC BLOOD PRESSURE: 116 MMHG

## 2022-02-16 DIAGNOSIS — L98.8 SKIN LESION OF BREAST: Primary | ICD-10-CM

## 2022-02-16 DIAGNOSIS — L72.0 EPIDERMOID CYST OF SKIN OF LEFT BREAST: ICD-10-CM

## 2022-02-16 DIAGNOSIS — N64.4 BREAST PAIN: ICD-10-CM

## 2022-02-16 DIAGNOSIS — Z80.3 FAMILY HISTORY OF BREAST CANCER: ICD-10-CM

## 2022-02-16 PROCEDURE — 3017F COLORECTAL CA SCREEN DOC REV: CPT | Performed by: NURSE PRACTITIONER

## 2022-02-16 PROCEDURE — 1123F ACP DISCUSS/DSCN MKR DOCD: CPT | Performed by: NURSE PRACTITIONER

## 2022-02-16 PROCEDURE — 4040F PNEUMOC VAC/ADMIN/RCVD: CPT | Performed by: NURSE PRACTITIONER

## 2022-02-16 PROCEDURE — G8417 CALC BMI ABV UP PARAM F/U: HCPCS | Performed by: NURSE PRACTITIONER

## 2022-02-16 PROCEDURE — 99204 OFFICE O/P NEW MOD 45 MIN: CPT | Performed by: NURSE PRACTITIONER

## 2022-02-16 PROCEDURE — 1090F PRES/ABSN URINE INCON ASSESS: CPT | Performed by: NURSE PRACTITIONER

## 2022-02-16 PROCEDURE — G8427 DOCREV CUR MEDS BY ELIG CLIN: HCPCS | Performed by: NURSE PRACTITIONER

## 2022-02-16 PROCEDURE — G8399 PT W/DXA RESULTS DOCUMENT: HCPCS | Performed by: NURSE PRACTITIONER

## 2022-02-16 PROCEDURE — G8484 FLU IMMUNIZE NO ADMIN: HCPCS | Performed by: NURSE PRACTITIONER

## 2022-02-16 PROCEDURE — 1036F TOBACCO NON-USER: CPT | Performed by: NURSE PRACTITIONER

## 2022-02-16 RX ORDER — DOXYCYCLINE HYCLATE 100 MG
100 TABLET ORAL 2 TIMES DAILY
Qty: 20 TABLET | Refills: 0 | Status: SHIPPED | OUTPATIENT
Start: 2022-02-16 | End: 2022-02-26

## 2022-02-16 ASSESSMENT — ENCOUNTER SYMPTOMS
ABDOMINAL PAIN: 0
SHORTNESS OF BREATH: 0
COUGH: 0

## 2022-02-16 NOTE — PROGRESS NOTES
[facility]   Surgical Breast Oncology     Primary Care Provider: Marli Donald MD    CC: Breast Abscess, Left    Louise Bumpers is being seen at the request of Dr. Frank Santizo for a consultation for breast abscess. HPI: Ms. Louise Bumpers is a 77 y.o. woman who presents today with new left breast mass. First noticed a non-tender red lump on the lower portion of her left breast on 1/3/2022. The lump increased in size and redness over the last few days, now has a dark purple center. Mildly tender. No drainage. No warmth. No injury or trauma. Denies fevers/chills. She states that she does perform routine self breast evaluations and has not noticed any new abnormalities such as masses, skin changes, color changes, nipple discharge, or changes to the nipple-areolar complex. She has never had breast infections or breast cysts in the past. She has never required a breast biopsy. She has a family history of breast in her mother, sister, and niece. Diet: regular, tries to eat a healthy diet and avoids carbs and fatty foods  Alcohol: rarely  Exercise: walks 10,000+ steps daily  Sleep: about 8 hours a night  Caffeine: has one cup of coffee every am    INTERVAL HISTORY:  Bilateral screening mammogram 3/4/2021:  No concerning findings suggestive malignancy. BI-RADS 1. Bilateral diagnostic mammogram and left breast ultrasound 1/13/2022:  Patient's palpable finding corresponds to a 0.4 x 0.2 cm subcutaneous  lesion at 7 o'clock, left cm from left nipple.  This may represent an  infected subcutaneous inflammatory lesion  or a sebaceous cyst.  Overlying focal skin redness/lesion noted.  According to patient she has had this for 1-1/2 weeks.  Clinical follow-up advised. 2. No mammographic evidence of malignancy. BI-RADS 2        Review of Systems    Past Medical History:   Diagnosis Date    Allergic rhinitis     saw allergist, dr Candy Solares of years.   had allergy shots in the past    Breast mass in female 2022    FH: breast cancer 2021 breast mom and sister.  Hyperlipidemia LDL goal < 130     Hypertension     Osteoporosis     Prediabetes        Past Surgical History:   Procedure Laterality Date    WISDOM TOOTH EXTRACTION         Allergies as of 2022    (No Known Allergies)       Social History     Tobacco Use    Smoking status: Never Smoker    Smokeless tobacco: Never Used   Vaping Use    Vaping Use: Never used   Substance Use Topics    Alcohol use: Not Currently     Comment: rarely    Drug use: Never       Menstrual History:  Menarche age: 12  . Age first live birth: 23  Breastfeed: no  Postmenopausal  Natural menopause     Oral contraceptive use: yes for about 15 years and is not still using  Hormone replacement therapy use: no      Breast density: Heterogeneously dense   Ashkenazi [employer] Heritage: no   Genetic testing: the patient has not but her sister Jane Kee) has and was negative     Family history significant for breast and ovarian cancer: Mother Breast Cancer, age of dx [de-identified]  at 80  Sister (Trish Sauceda) Breast Cancer, age of dx 70  Niece (Celeste Redmond, daughter of Trish Sauceda) Breast Cancer, age of dx 36  at 37  Niece had a tumor on her neck at age 15 then Lung Cancer at 23  at 25  Brother Oly Lao 25, age of dx ? ??        Lifetime risk for breast cancer 16.4%    [unfilled]  Medication documentation has been reviewed in the electronic medical record and patient office intake form. REVIEW OF SYSTEMS:  Review of Systems   Constitutional: Negative for unexpected weight change. Eyes: Negative for visual disturbance. Respiratory: Negative for cough and shortness of breath. Cardiovascular: Negative for chest pain and palpitations. Gastrointestinal: Negative for abdominal pain. Musculoskeletal: Negative for arthralgias and myalgias. Neurological: Negative for headaches. Hematological: Negative for adenopathy.  Does not bruise/bleed easily. Psychiatric/Behavioral: Negative for dysphoric mood. The patient is not nervous/anxious. EXAM:  /74   Pulse 98   Resp 18   Ht 5' 6\" (1.676 m)   Wt 187 lb 3.2 oz (84.9 kg)   SpO2 98%   BMI 30.21 kg/m²   Physical Exam  Constitutional: She appearswell-nourished. No apparent distress. Breast: The patient was examined in the upright and supine position. Breasts are symmetrically ptotic. Right: No new masses or changes in breast contour. No skin changes of the breast or nipple areolar complex. No nipple inversion or discharge. No erythema, thickening (peau d'orange), or dimpling. Left: 1.5x3cm oval superficial lesion at 6:00, 4cmFN, no discrete underlying masses. There is surrounding erythema and purple discoloration centrally located within the lesion. No other masses or changes in breast contour. No skin changes of the breast or nipple areolar complex. No nipple inversion or discharge. No skin thickening (peau d'orange), or dimpling. There is no axillary lymphadenopathy palpated bilaterally. Head: Normocephalic and atraumatic:   Eyes: EOM are normal. Pupils are equal, round, and reactive to light. Neck: Neck supple. No tracheal deviation present. No obvious mass. Lymphatics: No palpable supraclavicular, cervical, or axillary lymphadenopathy  Skin: No rash noted. No erythema. Neurologic: alert and oriented. Extremities: appear well perfused. Risk assessment using [facility] Breast Cancer Risk Evaluation Tool to evaluate her risk compared to the general population. Her lifetime risk for breast cancer is 16.4% (general population average 7%). 20% or greater is considered to be high risk for breast cancer. ASSESSMENT:   · Left Breast Skin lesion - probable inflamed epidermoid cyst based on clinical exam findings and recent breast imaging findings.   · Breast Pain - secondary to above  · Family history of breast cancermother, sister, niece PLAN:   · Antibiotics: Doxycycline 100 mg PO BID for 10 days. · If not improvement with antibiotics will consider skin punch biopsy for more definitive diagnosis   · Reviewed pathophysiology of epidermoid cysts   · Call the office for any of the concerning symptoms: worsening pain/tenderness, increased firmness, warmth, swelling, skin changes, fever and/or chills. · Most recent breast imaging reviewed, discussed with the patient, and documented above. · Discussed the importance of breast awareness including the importance and technique of self breast exams  · Follow up 3 weeks       All of the patient's questions were answered at this time however, she was encouraged to call the office with any further inquiries. Approximately 45 minutes of time were spent in preparation, direct patient contact, counseling, care coordination, documentation and activities otherwise related to this encounter.           EDUARDO Jacobs-CNP  [facility])   Surgical Breast Oncology   [phone]

## 2022-02-16 NOTE — PATIENT INSTRUCTIONS
Healthy Lifestyle Recommendations: healthy diet (decrease consumption of red meat, increase fresh fruits and vegetables), decreased alcohol consumption (less than 4 drinks/week), adequate sleep (goal 6-8 hours), routine exercise (goal 150 minutes/week or greater), weight control. Patient Education        Breast Self-Exam: Care Instructions  Your Care Instructions     A breast self-exam is when you check your breasts for lumps or changes. This regular exam helps you learn how your breasts normally look and feel. Most breast problems or changes are not because of cancer. Breast self-exam is not a substitute for a mammogram. Having regular breast exams by your doctor and regular mammograms improve your chances of finding any problems with your breasts. Some women set a time each month to do a step-by-step breast self-exam. Other women like a less formal system. They might look at their breasts as they brush their teeth, or feel their breasts once in a while in the shower. If you notice a change in your breast, tell your doctor. Follow-up care is a key part of your treatment and safety. Be sure to make and go to all appointments, and call your doctor if you are having problems. It's also a good idea to know your test results and keep a list of the medicines you take. How do you do a breast self-exam?  · The best time to examine your breasts is usually one week after your menstrual period begins. Your breasts should not be tender then. If you do not have periods, you might do your exam on a day of the month that is easy to remember. · To examine your breasts:  ? Remove all your clothes above the waist and lie down. When you are lying down, your breast tissue spreads evenly over your chest wall, which makes it easier to feel all your breast tissue. ?  Use the padsnot the fingertipsof the 3 middle fingers of your left hand to check your right breast. Move your fingers slowly in small coin-sized circles that overlap. ? Use three levels of pressure to feel of all your breast tissue. Use light pressure to feel the tissue close to the skin surface. Use medium pressure to feel a little deeper. Use firm pressure to feel your tissue close to your breastbone and ribs. Use each pressure level to feel your breast tissue before moving on to the next spot. ? Check your entire breast, moving up and down as if following a strip from the collarbone to the bra line, and from the armpit to the ribs. Repeat until you have covered the entire breast.  ? Repeat this procedure for your left breast, using the pads of the 3 middle fingers of your right hand. · To examine your breasts while in the shower:  ? Place one arm over your head and lightly soap your breast on that side. ? Using the pads of your fingers, gently move your hand over your breast (in the strip pattern described above), feeling carefully for any lumps or changes. ? Repeat for the other breast.  · Have your doctor inspect anything you notice to see if you need further testing. Where can you learn more? Go to [email or web address]. org and sign in to your [employer] account. Enter P148 in the [employer] box to learn more about \"Breast Self-Exam: Care Instructions. \"     If you do not have an account, please click on the \"Sign Up Now\" link. Current as of: September 8, 2021               Content Version: 13.1  © 2006-2021 Healthwise, Incorporated. Care instructions adapted under license by [facility] ([facility]). If you have questions about a medical condition or this instruction, always ask your healthcare professional. [place] [address] any warranty or liability for your use of this information.

## 2022-03-09 ENCOUNTER — OFFICE VISIT (OUTPATIENT)
Dept: BREAST CENTER | Age: 67
End: 2022-03-09
Payer: COMMERCIAL

## 2022-03-09 VITALS
WEIGHT: 183.6 LBS | HEIGHT: 66 IN | OXYGEN SATURATION: 96 % | SYSTOLIC BLOOD PRESSURE: 122 MMHG | RESPIRATION RATE: 18 BRPM | DIASTOLIC BLOOD PRESSURE: 76 MMHG | BODY MASS INDEX: 29.51 KG/M2 | HEART RATE: 84 BPM

## 2022-03-09 DIAGNOSIS — L72.0 EPIDERMOID CYST OF SKIN OF LEFT BREAST: ICD-10-CM

## 2022-03-09 DIAGNOSIS — Z80.3 FAMILY HISTORY OF BREAST CANCER: ICD-10-CM

## 2022-03-09 DIAGNOSIS — N64.4 BREAST PAIN: ICD-10-CM

## 2022-03-09 DIAGNOSIS — L98.8 SKIN LESION OF BREAST: Primary | ICD-10-CM

## 2022-03-09 PROCEDURE — 1090F PRES/ABSN URINE INCON ASSESS: CPT | Performed by: NURSE PRACTITIONER

## 2022-03-09 PROCEDURE — 1123F ACP DISCUSS/DSCN MKR DOCD: CPT | Performed by: NURSE PRACTITIONER

## 2022-03-09 PROCEDURE — G8484 FLU IMMUNIZE NO ADMIN: HCPCS | Performed by: NURSE PRACTITIONER

## 2022-03-09 PROCEDURE — G8399 PT W/DXA RESULTS DOCUMENT: HCPCS | Performed by: NURSE PRACTITIONER

## 2022-03-09 PROCEDURE — G8417 CALC BMI ABV UP PARAM F/U: HCPCS | Performed by: NURSE PRACTITIONER

## 2022-03-09 PROCEDURE — 4040F PNEUMOC VAC/ADMIN/RCVD: CPT | Performed by: NURSE PRACTITIONER

## 2022-03-09 PROCEDURE — 3017F COLORECTAL CA SCREEN DOC REV: CPT | Performed by: NURSE PRACTITIONER

## 2022-03-09 PROCEDURE — 1036F TOBACCO NON-USER: CPT | Performed by: NURSE PRACTITIONER

## 2022-03-09 PROCEDURE — 99213 OFFICE O/P EST LOW 20 MIN: CPT | Performed by: NURSE PRACTITIONER

## 2022-03-09 PROCEDURE — G8427 DOCREV CUR MEDS BY ELIG CLIN: HCPCS | Performed by: NURSE PRACTITIONER

## 2022-03-09 ASSESSMENT — ENCOUNTER SYMPTOMS
COUGH: 0
ABDOMINAL PAIN: 0
SHORTNESS OF BREATH: 0

## 2022-03-09 NOTE — PROGRESS NOTES
[facility]   Surgical Breast Oncology     Primary Care Provider: Sandee Melissa MD    CC: Breast Abscess, Left    HPI: Ms. Kimmie Pennington is a 79 y.o. woman who presents today for f/u of left breast mass. First noticed a non-tender red lump on the lower portion of her left breast on 1/3/2022. The lump increased in size and redness, developed a dark purple center, was mildly tender. No drainage. No warmth. No injury or trauma. No fevers/chills. Completed 10 days of Doxycycline with good improvement, mass resolved. She states that she does perform routine self breast evaluations and has not noticed any new abnormalities such as masses, skin changes, color changes, nipple discharge, or changes to the nipple-areolar complex. She has never had breast infections or breast cysts in the past prior to this episode. She has never required a breast biopsy. She has a family history of breast cancer in her mother, sister, and niece. Diet: regular, tries to eat a healthy diet and avoids carbs and fatty foods  Alcohol: rarely  Exercise: walks 10,000+ steps daily  Sleep: about 8 hours a night  Caffeine: has one cup of coffee every am    INTERVAL HISTORY:  Bilateral screening mammogram 3/4/2021:  No concerning findings suggestive malignancy. BI-RADS 1. Bilateral diagnostic mammogram and left breast ultrasound 1/13/2022:  Patient's palpable finding corresponds to a 0.4 x 0.2 cm subcutaneous  lesion at 7 o'clock, left cm from left nipple.  This may represent an  infected subcutaneous inflammatory lesion  or a sebaceous cyst.  Overlying focal skin redness/lesion noted.  According to patient she has had this for 1-1/2 weeks.  Clinical follow-up advised. 2. No mammographic evidence of malignancy. BI-RADS 2    Review of Systems    Past Medical History:   Diagnosis Date    Allergic rhinitis     saw allergist, dr Taylor Links of years.   had allergy shots in the past    Breast mass in female 1/6/2022    FH: breast cancer 2021 breast mom and sister.  Hyperlipidemia LDL goal < 130     Hypertension     Osteoporosis     Prediabetes        Past Surgical History:   Procedure Laterality Date    WISDOM TOOTH EXTRACTION         Allergies as of 2022    (No Known Allergies)       Social History     Tobacco Use    Smoking status: Never Smoker    Smokeless tobacco: Never Used   Vaping Use    Vaping Use: Never used   Substance Use Topics    Alcohol use: Not Currently     Comment: rarely    Drug use: Never       Menstrual History:  Menarche age: 12  . Age first live birth: 23  Breastfeed: no  Postmenopausal  Natural menopause     Oral contraceptive use: yes for about 15 years and is not still using  Hormone replacement therapy use: no      Breast density: Heterogeneously dense   Ashkenazi [employer] Heritage: no   Genetic testing: the patient has not but her sister Samantha Keller) has and was negative     Family history significant for breast and ovarian cancer: Mother Breast Cancer, dx [de-identified]  at 80  Sister (Perry Aguilar) Breast Cancer, age of dx 70  Niece (Cam Harkins, daughter of Perry Aguilar) Breast Cancer, age of dx 36  at 37  Niece had a tumor on her neck at age 15 then Lung Cancer at 23  at 25  Brother Oly Lao 25, age of dx ? ??     Lifetime risk for breast cancer 16.4%    [unfilled]  Medication documentation has been reviewed in the electronic medical record and patient office intake form. REVIEW OF SYSTEMS:  Review of Systems   Constitutional: Negative for unexpected weight change. Eyes: Negative for visual disturbance. Respiratory: Negative for cough and shortness of breath. Cardiovascular: Negative for chest pain and palpitations. Gastrointestinal: Negative for abdominal pain. Musculoskeletal: Negative for arthralgias and myalgias. Neurological: Negative for headaches. Hematological: Negative for adenopathy. Does not bruise/bleed easily. Psychiatric/Behavioral: Negative for dysphoric mood. The patient is not nervous/anxious. EXAM:  /76   Pulse 84   Resp 18   Ht 5' 6\" (1.676 m)   Wt 183 lb 9.6 oz (83.3 kg)   SpO2 96%   BMI 29.63 kg/m²   Physical Exam  Constitutional: She appearswell-nourished. No apparent distress. Breast: The patient was examined in the upright and supine position. Breasts are symmetrically ptotic. Right: No new masses or changes in breast contour. No skin changes of the breast or nipple areolar complex. No nipple inversion or discharge. No erythema, thickening (peau d'orange), or dimpling. Left: 1.5x3cm oval superficial lesion at 6:00, 4cmFN, has resolved. Erythema and purple discoloration centrally located within the lesion has resolved. No other masses or changes in breast contour. No skin changes of the breast or nipple areolar complex. No nipple inversion or discharge. No skin thickening (peau d'orange), or dimpling. There is no axillary lymphadenopathy palpated bilaterally. Head: Normocephalic and atraumatic:   Eyes: EOM are normal. Pupils are equal, round, and reactive to light. Neck: Neck supple. No tracheal deviation present. No obvious mass. Lymphatics: No palpable supraclavicular, cervical, or axillary lymphadenopathy  Skin: No rash noted. No erythema. Neurologic: alert and oriented. Extremities: appear well perfused. Risk assessment using [facility] Breast Cancer Risk Evaluation Tool to evaluate her risk compared to the general population. Her lifetime risk for breast cancer is 16.4% (general population average 7%). 20% or greater is considered to be high risk for breast cancer. ASSESSMENT:   · Left Breast Skin lesion - probable inflamed epidermoid cyst based on clinical exam findings and recent breast imaging findings. Completed 10 days of Doxycycline 100mg PO BID. Improved and inflammation resolved. · Breast Pain - secondary to above. Resolved. · Family history of breast cancermother, sister, niece     PLAN:   · Reviewed pathophysiology of epidermoid cysts   · Call the office for any of the concerning symptoms: worsening pain/tenderness, increased firmness, warmth, swelling, skin changes, fever and/or chills. · Most recent breast imaging reviewed, discussed with the patient, and documented above. · Discussed the importance of breast awareness including the importance and technique of self breast exams  · Annual screening mammography due 1/2023  · Follow up after mammogram or earlier if needed. All of the patient's questions were answered at this time however, she was encouraged to call the office with any further inquiries. Approximately 25 minutes of time were spent in preparation, direct patient contact, counseling, care coordination, documentation and activities otherwise related to this encounter.           EDUARDO Salguero-CNP  [facility])   Surgical Breast Oncology   [phone]

## 2022-05-31 ENCOUNTER — OFFICE VISIT (OUTPATIENT)
Dept: FAMILY MEDICINE CLINIC | Age: 67
End: 2022-05-31
Payer: COMMERCIAL

## 2022-05-31 VITALS
RESPIRATION RATE: 16 BRPM | WEIGHT: 183 LBS | HEIGHT: 66 IN | SYSTOLIC BLOOD PRESSURE: 142 MMHG | HEART RATE: 91 BPM | DIASTOLIC BLOOD PRESSURE: 99 MMHG | OXYGEN SATURATION: 98 % | BODY MASS INDEX: 29.41 KG/M2

## 2022-05-31 DIAGNOSIS — R73.03 PREDIABETES: ICD-10-CM

## 2022-05-31 DIAGNOSIS — Z80.3 FH: BREAST CANCER: ICD-10-CM

## 2022-05-31 DIAGNOSIS — J30.9 ALLERGIC RHINITIS, UNSPECIFIED SEASONALITY, UNSPECIFIED TRIGGER: ICD-10-CM

## 2022-05-31 DIAGNOSIS — Z86.010 HISTORY OF ADENOMATOUS POLYP OF COLON: ICD-10-CM

## 2022-05-31 DIAGNOSIS — M85.80 OSTEOPENIA, UNSPECIFIED LOCATION: ICD-10-CM

## 2022-05-31 DIAGNOSIS — M54.31 SCIATICA OF RIGHT SIDE: ICD-10-CM

## 2022-05-31 DIAGNOSIS — Z00.01 ENCOUNTER FOR GENERAL ADULT MEDICAL EXAMINATION WITH ABNORMAL FINDINGS: Primary | ICD-10-CM

## 2022-05-31 DIAGNOSIS — E78.00 PURE HYPERCHOLESTEROLEMIA: ICD-10-CM

## 2022-05-31 DIAGNOSIS — R03.0 ELEVATED BLOOD PRESSURE READING: ICD-10-CM

## 2022-05-31 PROBLEM — Z86.0101 HISTORY OF ADENOMATOUS POLYP OF COLON: Status: ACTIVE | Noted: 2017-04-17

## 2022-05-31 LAB
A/G RATIO: 1.5 (ref 1.1–2.2)
ALBUMIN SERPL-MCNC: 4.5 G/DL (ref 3.4–5)
ALP BLD-CCNC: 109 U/L (ref 40–129)
ALT SERPL-CCNC: 15 U/L (ref 10–40)
ANION GAP SERPL CALCULATED.3IONS-SCNC: 17 MMOL/L (ref 3–16)
AST SERPL-CCNC: 12 U/L (ref 15–37)
BILIRUB SERPL-MCNC: 0.3 MG/DL (ref 0–1)
BUN BLDV-MCNC: 12 MG/DL (ref 7–20)
CALCIUM SERPL-MCNC: 9.9 MG/DL (ref 8.3–10.6)
CHLORIDE BLD-SCNC: 101 MMOL/L (ref 99–110)
CHOLESTEROL, FASTING: 178 MG/DL (ref 0–199)
CO2: 19 MMOL/L (ref 21–32)
CREAT SERPL-MCNC: 0.9 MG/DL (ref 0.6–1.2)
GFR AFRICAN AMERICAN: >60
GFR NON-AFRICAN AMERICAN: >60
GLUCOSE BLD-MCNC: 111 MG/DL (ref 70–99)
HDLC SERPL-MCNC: 48 MG/DL (ref 40–60)
LDL CHOLESTEROL CALCULATED: 112 MG/DL
POTASSIUM SERPL-SCNC: 4.6 MMOL/L (ref 3.5–5.1)
SODIUM BLD-SCNC: 137 MMOL/L (ref 136–145)
TOTAL PROTEIN: 7.6 G/DL (ref 6.4–8.2)
TRIGLYCERIDE, FASTING: 91 MG/DL (ref 0–150)
VITAMIN D 25-HYDROXY: 53.7 NG/ML
VLDLC SERPL CALC-MCNC: 18 MG/DL

## 2022-05-31 PROCEDURE — 99397 PER PM REEVAL EST PAT 65+ YR: CPT | Performed by: INTERNAL MEDICINE

## 2022-05-31 PROCEDURE — 36415 COLL VENOUS BLD VENIPUNCTURE: CPT | Performed by: INTERNAL MEDICINE

## 2022-05-31 RX ORDER — FLUTICASONE PROPIONATE 50 MCG
SPRAY, SUSPENSION (ML) NASAL
Qty: 16 G | Refills: 5 | Status: SHIPPED | OUTPATIENT
Start: 2022-05-31

## 2022-05-31 RX ORDER — MONTELUKAST SODIUM 10 MG/1
10 TABLET ORAL NIGHTLY
Qty: 90 TABLET | Refills: 3 | Status: SHIPPED | OUTPATIENT
Start: 2022-05-31

## 2022-05-31 RX ORDER — ATORVASTATIN CALCIUM 40 MG/1
40 TABLET, FILM COATED ORAL DAILY
Qty: 90 TABLET | Refills: 3 | Status: SHIPPED | OUTPATIENT
Start: 2022-05-31

## 2022-05-31 SDOH — ECONOMIC STABILITY: FOOD INSECURITY: WITHIN THE PAST 12 MONTHS, YOU WORRIED THAT YOUR FOOD WOULD RUN OUT BEFORE YOU GOT MONEY TO BUY MORE.: NEVER TRUE

## 2022-05-31 SDOH — ECONOMIC STABILITY: FOOD INSECURITY: WITHIN THE PAST 12 MONTHS, THE FOOD YOU BOUGHT JUST DIDN'T LAST AND YOU DIDN'T HAVE MONEY TO GET MORE.: NEVER TRUE

## 2022-05-31 ASSESSMENT — PATIENT HEALTH QUESTIONNAIRE - PHQ9
SUM OF ALL RESPONSES TO PHQ QUESTIONS 1-9: 0
SUM OF ALL RESPONSES TO PHQ QUESTIONS 1-9: 0
SUM OF ALL RESPONSES TO PHQ9 QUESTIONS 1 & 2: 0
SUM OF ALL RESPONSES TO PHQ QUESTIONS 1-9: 0
2. FEELING DOWN, DEPRESSED OR HOPELESS: 0
1. LITTLE INTEREST OR PLEASURE IN DOING THINGS: 0
SUM OF ALL RESPONSES TO PHQ QUESTIONS 1-9: 0

## 2022-05-31 ASSESSMENT — SOCIAL DETERMINANTS OF HEALTH (SDOH): HOW HARD IS IT FOR YOU TO PAY FOR THE VERY BASICS LIKE FOOD, HOUSING, MEDICAL CARE, AND HEATING?: NOT HARD AT ALL

## 2022-05-31 NOTE — PATIENT INSTRUCTIONS
Blood pressures at home if you are able to get a cuff. Your goal is 130/80 or less. Follow up 1-2 weeks for recheck non MD visit    Call for your bone density test at [phone]    Call for your colonoscopy    Consider getting a shingles vaccine and the pneumonia 20 vaccine. I recommend getting a total of 4 COVID vaccinesa    If low back not improved with exercises can set up PT      Patient Education        Low Back Pain: Exercises  Introduction  Here are some examples of exercises for you to try. The exercises may be suggested for a condition or for rehabilitation. Start each exercise slowly. Ease off the exercises if you start to have pain. You will be told when to start these exercises and which ones will work bestfor you. How to do the exercises  Press-up    1. Lie on your stomach, supporting your body with your forearms. 2. Press your elbows down into the floor to raise your upper back. As you do this, relax your stomach muscles and allow your back to arch without using your back muscles. As your press up, do not let your hips or pelvis come off the floor. 3. Hold for 15 to 30 seconds, then relax. 4. Repeat 2 to 4 times. Alternate arm and leg (bird dog) exercise    Do this exercise slowly. Try to keep your body straight at all times, and donot let one hip drop lower than the other. 1. Start on the floor, on your hands and knees. 2. Tighten your belly muscles. 3. Raise one leg off the floor, and hold it straight out behind you. Be careful not to let your hip drop down, because that will twist your trunk. 4. Hold for about 6 seconds, then lower your leg and switch to the other leg. 5. Repeat 8 to 12 times on each leg. 6. Over time, work up to holding for 10 to 30 seconds each time. 7. If you feel stable and secure with your leg raised, try raising the opposite arm straight out in front of you at the same time. Knee-to-chest exercise    1.  Lie on your back with your knees bent and your feet 10 seconds. 4. Do 8 to 12 repetitions. Hamstring stretch in doorway    1. Lie on your back in a doorway, with one leg through the open door. 2. Slide your leg up the wall to straighten your knee. You should feel a gentle stretch down the back of your leg. 3. Hold the stretch for at least 15 to 30 seconds. Do not arch your back, point your toes, or bend either knee. Keep one heel touching the floor and the other heel touching the wall. 4. Repeat with your other leg. 5. Do 2 to 4 times for each leg. Hip flexor stretch    1. Kneel on the floor with one knee bent and one leg behind you. Place your forward knee over your foot. Keep your other knee touching the floor. 2. Slowly push your hips forward until you feel a stretch in the upper thigh of your rear leg. 3. Hold the stretch for at least 15 to 30 seconds. Repeat with your other leg. 4. Do 2 to 4 times on each side. Wall sit    1. Stand with your back 10 to 12 inches away from a wall. 2. Lean into the wall until your back is flat against it. 3. Slowly slide down until your knees are slightly bent, pressing your lower back into the wall. 4. Hold for about 6 seconds, then slide back up the wall. 5. Repeat 8 to 12 times. Follow-up care is a key part of your treatment and safety. Be sure to make and go to all appointments, and call your doctor if you are having problems. It's also a good idea to know your test results and keep alist of the medicines you take. Where can you learn more? Go to https://PEAK-ITneftali.SureVisit. org and sign in to your [employer] account. Enter Y445 in the [employer] box to learn more about \"Low Back Pain: Exercises. \"     If you do not have an account, please click on the \"Sign Up Now\" link. Current as of: July 1, 2021               Content Version: 13.2  © 8983-3323 Healthwise, Incorporated. Care instructions adapted under license by [facility] ([facility]).  If you have questions about a medical condition or this instruction, always ask your healthcare professional. [address] any warranty or liability for your use of this information. Patient Education        DASH Diet: Care Instructions  Your Care Instructions     The DASH diet is an eating plan that can help lower your blood pressure. DASH stands for Dietary Approaches to Stop Hypertension. Hypertension is high bloodpressure. The DASH diet focuses on eating foods that are high in calcium, potassium, and magnesium. These nutrients can lower blood pressure. The foods that are highest in these nutrients are fruits, vegetables, low-fat dairy products, nuts, seeds, and legumes. But taking calcium, potassium, and magnesium supplements instead of eating foods that are high in those nutrients does not have the same effect. The DASH diet also includes whole grains, fish, and poultry. The DASH diet is one of several lifestyle changes your doctor may recommend to lower your high blood pressure. Your doctor may also want you to decrease the amount of sodium in your diet. Lowering sodium while following the DASH dietcan lower blood pressure even further than just the DASH diet alone. Follow-up care is a key part of your treatment and safety. Be sure to make and go to all appointments, and call your doctor if you are having problems. It's also a good idea to know your test results and keep alist of the medicines you take. How can you care for yourself at home? Following the DASH diet   Eat 4 to 5 servings of fruit each day. A serving is 1 medium-sized piece of fruit, ½ cup chopped or canned fruit, 1/4 cup dried fruit, or 4 ounces (½ cup) of fruit juice. Choose fruit more often than fruit juice.  Eat 4 to 5 servings of vegetables each day. A serving is 1 cup of lettuce or raw leafy vegetables, ½ cup of chopped or cooked vegetables, or 4 ounces (½ cup) of vegetable juice. Choose vegetables more often than vegetable juice.    Get 2 to 3 servings of low-fat and fat-free dairy each day. A serving is 8 ounces of milk, 1 cup of yogurt, or 1 ½ ounces of cheese.  Eat 6 to 8 servings of grains each day. A serving is 1 slice of bread, 1 ounce of dry cereal, or ½ cup of cooked rice, pasta, or cooked cereal. Try to choose whole-grain products as much as possible.  Limit lean meat, poultry, and fish to 2 servings each day. A serving is 3 ounces, about the size of a deck of cards.  Eat 4 to 5 servings of nuts, seeds, and legumes (cooked dried beans, lentils, and split peas) each week. A serving is 1/3 cup of nuts, 2 tablespoons of seeds, or ½ cup of cooked beans or peas.  Limit fats and oils to 2 to 3 servings each day. A serving is 1 teaspoon of vegetable oil or 2 tablespoons of salad dressing.  Limit sweets and added sugars to 5 servings or less a week. A serving is 1 tablespoon jelly or jam, ½ cup sorbet, or 1 cup of lemonade.  Eat less than 2,300 milligrams (mg) of sodium a day. If you limit your sodium to 1,500 mg a day, you can lower your blood pressure even more.  Be aware that all of these are the suggested number of servings for people who eat 1,800 to 2,000 calories a day. Your recommended number of servings may be different if you need more or fewer calories. Tips for success   Start small. Do not try to make dramatic changes to your diet all at once. You might feel that you are missing out on your favorite foods and then be more likely to not follow the plan. Make small changes, and stick with them. Once those changes become habit, add a few more changes.  Try some of the following:  ? Make it a goal to eat a fruit or vegetable at every meal and at snacks. This will make it easy to get the recommended amount of fruits and vegetables each day. ? Try yogurt topped with fruit and nuts for a snack or healthy dessert. ? Add lettuce, tomato, cucumber, and onion to sandwiches.   ? Combine a ready-made pizza crust with low-fat mozzarella cheese and lots of vegetable toppings. Try using tomatoes, squash, spinach, broccoli, carrots, cauliflower, and onions. ? Have a variety of cut-up vegetables with a low-fat dip as an appetizer instead of chips and dip. ? Sprinkle sunflower seeds or chopped almonds over salads. Or try adding chopped walnuts or almonds to cooked vegetables. ? Try some vegetarian meals using beans and peas. Add garbanzo or kidney beans to salads. Make burritos and tacos with mashed wayne beans or black beans. Where can you learn more? Go to https://DataheropeSYMIC BIOMEDICAL.WeoGeo. org and sign in to your [employer] account. Enter B871 in the [facility] box to learn more about \"DASH Diet: Care Instructions. \"     If you do not have an account, please click on the \"Sign Up Now\" link. Current as of: January 10, 2022               Content Version: 13.2  © 2006-2022 Healthwise, Incorporated. Care instructions adapted under license by [facility] ([facility]). If you have questions about a medical condition or this instruction, always ask your healthcare professional. [place] [address] any warranty or liability for your use of this information.

## 2022-05-31 NOTE — PROGRESS NOTES
History and Physical      Vamsi Farris  YOB: 1955    Date of Service:  2022    Chief Complaint:   Vamsi Farris is a 79 y.o. female who presents for complete physical examination. HPI:     Chronic health issues include prediabetes, history hypertension, osteoporosis, family history breast cancer, tubular adenoma, rhinitis,    Sciatica symptoms. Urgent care with steroids. Onset sxs 1 year ago. Daily pain x 1 month. Right side to knee. No falls. Heat worse. Ice good. Tried a stretching exercise. Transient nsaid. Not using medication at this time. 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 Feels like things are worsened at work.     Prediabetes. A1c of 6.2. Weight is stable. Is following diet. Active at work. History hypertension with low-dose lisinopril. Discontinued without difficulty. Has not checked her blood pressure however in urgent count it was elevated. Positive atorvastatin. Chest pain palpitations lower extremity edema or known apnea. Osteoporosis switched osteopenia post Fosamax bone density 2018 T score femoral neck -1.9. Adverse reaction to Fosamax. Minimal alcohol positive exercise. History stress fracture. Vitamin D supplementation     Hx tubular adenoma  recheck 2022    Rhinitis. Transient immunotherapy. Singulair and H1 blocker. Low days.      . No gestational DM or toxemia. Has GYN. Amenorrhic. Sexually active. . Strong family history of breast cancer mother sister niece. BI-RADS 1 mammogram 2021. Does her own exams. Not interested in breast surgeon or specialist at this time. Megha Santiago Epidermoid cyst which was infected in the breast.  Followed with breast surgery and resolved on doxycycline.     Specialists    Dr Vivi Bronson    Dentist    breast surgeon         PMH:   wisdom tooth  Childbirth     SH lives alone. . , [employer]. No tobacco. Rarealcohol No drugs. No current partners.   Not planning     FH:    + breast cancer mom, sister and niece, brother, [de-identified] yo valve replacement, brother cad    - DM    View of systems: Cataract. Glasses. Up-to-date dental.  No falls. No wheezes. Positive rhinitis. No chest pain palpitations lower extremity edema. Rare GE reflux. No bloody stools. Low back discomfort.   Positive dermatologist.         Wt Readings from Last 3 Encounters:   03/09/22 183 lb 9.6 oz (83.3 kg)   02/16/22 187 lb 3.2 oz (84.9 kg)   01/13/22 180 lb (81.6 kg)     BP Readings from Last 3 Encounters:   03/09/22 122/76   02/16/22 116/74   06/01/21 126/82       Patient Active Problem List   Diagnosis    Hypertension    Allergic rhinitis    Prediabetes    Osteoporosis    Tubular adenoma    Pure hypercholesterolemia    FH: breast cancer    Breast mass in female       Preventive Care:  Health Maintenance   Topic Date Due    Shingles vaccine (2 of 3) 10/27/2015    Pneumococcal 65+ years Vaccine (2 - PCV) 11/13/2021    Colorectal Cancer Screen  04/03/2022    A1C test (Diabetic or Prediabetic)  06/01/2022    Lipids  06/01/2022    Depression Screen  06/01/2022    Flu vaccine (Season Ended) 09/01/2022    Breast cancer screen  01/13/2023    DTaP/Tdap/Td vaccine (2 - Td or Tdap) 03/14/2024    DEXA (modify frequency per FRAX score)  Completed    COVID-19 Vaccine  Completed    Hepatitis C screen  Completed    Hepatitis A vaccine  Aged Out    Hepatitis B vaccine  Aged Out    Hib vaccine  Aged Out    Meningococcal (ACWY) vaccine  Aged Out        Self-breast exams: yes  Previous DEXA scan: yes osteopenia    Last eye exam: 2022,   Exercise: active  Seatbelt use: yes  Lipid panel:   Lab Results   Component Value Date    CHOL 178 06/01/2021    TRIG 124 06/01/2021    HDL 51 06/01/2021    LDLCALC 102 (H) 06/01/2021          Immunization History   Administered Date(s) Administered    COVID-19, Pfizer Purple top, DILUTE for use, 12+ yrs, 30mcg/0.3mL dose 03/15/2021, 04/12/2021, 12/07/2021    Influenza Virus Vaccine 2016, 10/01/2019, 10/15/2020    Influenza, High Dose (Fluzone 65 yrs and older) 2017    Influenza, Janas Rota, Recombinant, IM PF (Flublok 18 yrs and older) 2018    Pneumococcal Polysaccharide ([id number]) 2020    Tdap (Boostrix, Adacel) 2014    Zoster Live (Zostavax) 2015       No Known Allergies  Outpatient Medications Marked as Taking for the 22 encounter (Office Visit) with Carson Valenzuela MD   Medication Sig Dispense Refill    fluticasone (FLONASE) 50 MCG/ACT nasal spray 2 puffs per nose daily 16 g 5    montelukast (SINGULAIR) 10 MG tablet Take 1 tablet by mouth nightly 90 tablet 3    atorvastatin (LIPITOR) 40 MG tablet Take 1 tablet by mouth daily 90 tablet 3    cetirizine (ZYRTEC) 10 MG tablet Take 10 mg by mouth as needed for Allergies.  vitamin D (CHOLECALCIFEROL) 1000 UNIT TABS tablet Take 1,000 Units by mouth daily.  Omega-3 Fatty Acids (FISH OIL PO) Take  by mouth.  aspirin 81 MG tablet Take 81 mg by mouth daily. Past Medical History:   Diagnosis Date    Allergic rhinitis     saw allergist, dr Tri Wade of years. had allergy shots in the past    Breast mass in female 2022    FH: breast cancer 2021 breast mom and sister.     Hyperlipidemia LDL goal < 130     Hypertension     Osteoporosis     Prediabetes      Past Surgical History:   Procedure Laterality Date    WISDOM TOOTH EXTRACTION       Family History   Problem Relation Age of Onset    High Blood Pressure Mother     Heart Disease Mother     Cancer Mother 79        breast    Breast Cancer Mother [de-identified]         at 80    High Blood Pressure Father     Heart Disease Father     Heart Disease Paternal Grandfather     Cancer Other         one niece  of breast cancer in 45s, on  of lymphoma of 25s    Breast Cancer Sister 70    Breast Cancer Niece 36         at 37    Cancer Brother         Throat Cancer         Physical Exam: Vitals:    05/31/22 0907   BP: (!) 142/99   Pulse: 91   Resp: 16   SpO2: 98%   Weight: 183 lb (83 kg)   Height: 5' 6\" (1.676 m)     Body mass index is 29.54 kg/m². Constitutional: She is oriented to person, place, and time. She appears well-developed and well-nourished. No distress. HEENT: TMs unremarkable. Throat is clear. Positive dentition. Good upstroke of the carotids no bruits. No adenopathy. Lungs are clear no wheezes rales or rhonchi. Cardiovascular exam regular rate and rhythm without any murmur click. Breast without any dominant masses discharge or dimpling. No hepatosplenomegaly epigastric tenderness or mass. Good range of motion of the hips. Equal DTRs lower extremities diminished. Sensation intact. No muscle weakness. She is appropriate, well-groomed, good eye contact     Assessment/Plan:   Diagnosis Orders   1. Encounter for general adult medical examination with abnormal findings     2. History of adenomatous polyp of colon     3. Elevated blood pressure reading  Comprehensive Metabolic Panel   4. Osteopenia, unspecified location  DEXA BONE DENSITY 2 SITES    Vitamin D 25 Hydroxy   5. Prediabetes  Hemoglobin A1C   6. Pure hypercholesterolemia  atorvastatin (LIPITOR) 40 MG tablet    Lipid, Fasting   7. FH: breast cancer     8. Allergic rhinitis, unspecified seasonality, unspecified trigger     9. Sciatica of right side       Evaded diastolic pressure come back in 2 weeks. Possibility of calcium channel blocker if elevated. History adenomatous polyp will call for her repeat colonoscopy. Osteopenia. Intolerant to Fosamax vitamin D bone density. Prediabetes recent steroid. We will get an A1c. Low-carb diet exercise. Hyperlipidemia familial.  Recheck again today. Rhinitis doing well with her current medication. Sciatica. No red flags symptoms.   Home exercises given discussed Voltaren gel lidocaine patch Tylenol physical therapy if not improved with her home intervention    General health maintenance discussed pneumonia vaccine shingles and getting her COVID booster    There are no diagnoses linked to this encounter.

## 2022-06-01 LAB
ESTIMATED AVERAGE GLUCOSE: 139.9 MG/DL
HBA1C MFR BLD: 6.5 %

## 2022-06-14 ENCOUNTER — NURSE ONLY (OUTPATIENT)
Dept: FAMILY MEDICINE CLINIC | Age: 67
End: 2022-06-14

## 2022-06-14 VITALS — HEART RATE: 99 BPM | DIASTOLIC BLOOD PRESSURE: 89 MMHG | SYSTOLIC BLOOD PRESSURE: 137 MMHG

## 2022-06-14 NOTE — PROGRESS NOTES
Patient was here for lab visit to check BP. It was checked 3x. Patient waited a few minutes in between checks. Patient stated no dizziness, headaches or blurred vision.     1st: Left 133/93 Pulse 100   2nd: Left 148/98 Pulse 99  3rd: Right 137/89 Pulse

## 2022-06-14 NOTE — PROGRESS NOTES
I recommend starting diltiazem a calcium channel blocker blood pressure medication. It relaxes the arteries and decreases fast heart beats.   Will start with 120 mg daily and increase in 2-3 weeks if pressures not at goal. Would want office check at that time  Some people use 420 mg    Back to me

## 2022-06-22 RX ORDER — FLUTICASONE PROPIONATE 50 MCG
SPRAY, SUSPENSION (ML) NASAL
Refills: 5 | OUTPATIENT
Start: 2022-06-22

## 2022-06-22 NOTE — PROGRESS NOTES
Why am I just getting this? How much diltiazem was she taking? Needs to increase by one diltiazem and recheck in 1 week.

## 2022-06-29 ENCOUNTER — TELEPHONE (OUTPATIENT)
Dept: FAMILY MEDICINE CLINIC | Age: 67
End: 2022-06-29

## 2022-06-29 DIAGNOSIS — I10 HYPERTENSION, UNSPECIFIED TYPE: Primary | ICD-10-CM

## 2022-06-29 RX ORDER — DILTIAZEM HYDROCHLORIDE 120 MG/1
120 CAPSULE, COATED, EXTENDED RELEASE ORAL DAILY
Qty: 30 CAPSULE | Refills: 0 | Status: SHIPPED | OUTPATIENT
Start: 2022-06-29 | End: 2022-07-19

## 2022-06-29 NOTE — TELEPHONE ENCOUNTER
Diltiazem 120 mg sent to pharmacy per Dr. Anabel Carter note. Please schedule patient follow up visit in 2 weeks. Please document call and then close encounter.   thanks

## 2022-06-29 NOTE — TELEPHONE ENCOUNTER
Patient states the doctor was supposed to send in an Rx for a new blood pressure medication and then the patient was supposed to return for a BP check on 7/8/22. Patient still has not received the new BP medication and states what's the point of coming in for BP check when I don't have the new medication. Please return call and advise.

## 2022-07-15 ENCOUNTER — TELEPHONE (OUTPATIENT)
Dept: SURGERY | Age: 67
End: 2022-07-15

## 2022-07-15 ENCOUNTER — TELEPHONE (OUTPATIENT)
Dept: FAMILY MEDICINE CLINIC | Age: 67
End: 2022-07-15

## 2022-07-15 ENCOUNTER — NURSE ONLY (OUTPATIENT)
Dept: FAMILY MEDICINE CLINIC | Age: 67
End: 2022-07-15

## 2022-07-15 VITALS — DIASTOLIC BLOOD PRESSURE: 84 MMHG | OXYGEN SATURATION: 95 % | HEART RATE: 78 BPM | SYSTOLIC BLOOD PRESSURE: 136 MMHG

## 2022-07-15 DIAGNOSIS — I10 HYPERTENSION, UNSPECIFIED TYPE: ICD-10-CM

## 2022-07-15 PROCEDURE — 99999 PR OFFICE/OUTPT VISIT,PROCEDURE ONLY: CPT | Performed by: INTERNAL MEDICINE

## 2022-07-15 NOTE — PROGRESS NOTES
Spoke with patient about below message and she states she's never had headaches and she does need refills on medication.

## 2022-07-15 NOTE — PROGRESS NOTES
Subjective:   Joseph Munoz is a 79 y.o. female with hypertension. Patient in office for BP check on new medication. Patient notes that medication was taken today 30 minutes prior to visit. Medications and pharmacy verified with patient     Patient's initial reading on Rt arm was elevated 145/92. Allowed patient to sit quietly for 10 minutes before obtaining manual BP reading. Obtained manual BP at 10:12AM BP reading is 136/84. Patient denies: any symptoms at this time     Current Outpatient Medications   Medication Sig Dispense Refill    dilTIAZem (CARDIZEM CD) 120 MG extended release capsule Take 1 capsule by mouth daily 30 capsule 0    fluticasone (FLONASE) 50 MCG/ACT nasal spray 2 puffs per nose daily 16 g 5    montelukast (SINGULAIR) 10 MG tablet Take 1 tablet by mouth nightly 90 tablet 3    atorvastatin (LIPITOR) 40 MG tablet Take 1 tablet by mouth daily 90 tablet 3    Ibuprofen (ADVIL PO) Take by mouth      cetirizine (ZYRTEC) 10 MG tablet Take 10 mg by mouth as needed for Allergies. vitamin D (CHOLECALCIFEROL) 1000 UNIT TABS tablet Take 1,000 Units by mouth daily. Omega-3 Fatty Acids (FISH OIL PO) Take  by mouth. aspirin 81 MG tablet Take 81 mg by mouth daily. No current facility-administered medications for this visit. Hypertension ROS: taking medications as instructed, no medication side effects noted, no TIA's, no chest pain on exertion, no dyspnea on exertion, no swelling of ankles. New concerns: bilateral ankle swelling on and off. Patient notes that job consists of standing for periods of time. Objective: There were no vitals taken for this visit. Appearance alert, well appearing, and in no distress. General exam BP noted to be well controlled today in office, S1, S2 normal, no gallop, no murmur, chest clear, no JVD, no HSM, no edema. Lab review: no lab studies available for review at time of visit. Assessment:    Hypertension well controlled. Plan:   Sending note to PCP for guidance .

## 2022-07-15 NOTE — TELEPHONE ENCOUNTER
Patient advised of guidance and advised to self monitor at home as well .     Patient scheduled for nurse visit 7/29

## 2022-07-19 RX ORDER — DILTIAZEM HYDROCHLORIDE 240 MG/1
CAPSULE, COATED, EXTENDED RELEASE ORAL
Qty: 90 CAPSULE | Refills: 0 | Status: SHIPPED | OUTPATIENT
Start: 2022-07-19 | End: 2022-10-03 | Stop reason: SDUPTHER

## 2022-07-19 NOTE — TELEPHONE ENCOUNTER
Spoke to patient and patient states that this is the same breast and same area she had cyst when she saw us last visit. Patient states that the area had developed a \"cyst\" again and opened and started draining on its own, then developed a rash around the open area. She had an old cream prescribed by her GYN that was  that she applied to the area and now it is better. She was not wanting to change her appointment for a sooner time. Current appointment is 22 at 12:30 at [facility].    Patient educated to keep an eye on the area and to call us for a sooner appointment if things get worse. Patient verbalizes understanding.

## 2022-07-19 NOTE — TELEPHONE ENCOUNTER
Pt called in stating she will need a script for her Diltiazem called into [facility] in [place] now that her dose has been upped.

## 2022-07-21 DIAGNOSIS — I10 HYPERTENSION, UNSPECIFIED TYPE: ICD-10-CM

## 2022-07-21 RX ORDER — DILTIAZEM HYDROCHLORIDE 120 MG/1
CAPSULE, EXTENDED RELEASE ORAL
Qty: 30 CAPSULE | OUTPATIENT
Start: 2022-07-21

## 2022-07-29 ENCOUNTER — NURSE ONLY (OUTPATIENT)
Dept: FAMILY MEDICINE CLINIC | Age: 67
End: 2022-07-29

## 2022-07-29 VITALS — OXYGEN SATURATION: 98 % | DIASTOLIC BLOOD PRESSURE: 89 MMHG | SYSTOLIC BLOOD PRESSURE: 136 MMHG | HEART RATE: 86 BPM

## 2022-07-29 NOTE — PROGRESS NOTES
Patient came in for BP and HR check    BP: left arm 143/98                      136/89                         HR: 88         86    O2: 98    Patient states no headaches or blurred vision

## 2022-08-12 ENCOUNTER — OFFICE VISIT (OUTPATIENT)
Dept: BREAST CENTER | Age: 67
End: 2022-08-12
Payer: COMMERCIAL

## 2022-08-12 VITALS
RESPIRATION RATE: 18 BRPM | HEIGHT: 66 IN | DIASTOLIC BLOOD PRESSURE: 74 MMHG | BODY MASS INDEX: 29.86 KG/M2 | OXYGEN SATURATION: 97 % | SYSTOLIC BLOOD PRESSURE: 120 MMHG | HEART RATE: 83 BPM | WEIGHT: 185.8 LBS

## 2022-08-12 DIAGNOSIS — N64.4 BREAST PAIN: ICD-10-CM

## 2022-08-12 DIAGNOSIS — L72.0 EPIDERMOID CYST OF SKIN OF LEFT BREAST: ICD-10-CM

## 2022-08-12 DIAGNOSIS — L98.8 SKIN LESION OF BREAST: Primary | ICD-10-CM

## 2022-08-12 DIAGNOSIS — Z80.3 FAMILY HISTORY OF BREAST CANCER: ICD-10-CM

## 2022-08-12 PROCEDURE — G8399 PT W/DXA RESULTS DOCUMENT: HCPCS | Performed by: NURSE PRACTITIONER

## 2022-08-12 PROCEDURE — 1036F TOBACCO NON-USER: CPT | Performed by: NURSE PRACTITIONER

## 2022-08-12 PROCEDURE — G8427 DOCREV CUR MEDS BY ELIG CLIN: HCPCS | Performed by: NURSE PRACTITIONER

## 2022-08-12 PROCEDURE — G8417 CALC BMI ABV UP PARAM F/U: HCPCS | Performed by: NURSE PRACTITIONER

## 2022-08-12 PROCEDURE — 99213 OFFICE O/P EST LOW 20 MIN: CPT | Performed by: NURSE PRACTITIONER

## 2022-08-12 PROCEDURE — 1123F ACP DISCUSS/DSCN MKR DOCD: CPT | Performed by: NURSE PRACTITIONER

## 2022-08-12 PROCEDURE — 3017F COLORECTAL CA SCREEN DOC REV: CPT | Performed by: NURSE PRACTITIONER

## 2022-08-12 PROCEDURE — 1090F PRES/ABSN URINE INCON ASSESS: CPT | Performed by: NURSE PRACTITIONER

## 2022-08-12 NOTE — PROGRESS NOTES
[facility]   Surgical Breast Oncology     Primary Care Provider: Jody Stewart MD    CC: Breast Abscess, Left    HPI: Ms. Bela Singh is a 79 y.o. woman who presents today for f/u of left breast mass. First noticed a non-tender red lump on the lower portion of her left breast on 1/3/2022. The lump increased in size and redness, developed a dark purple center, was mildly tender. No drainage. No warmth. No injury or trauma. No fevers/chills. Completed 10 days of Doxycycline with good improvement, mass resolved. Mass then returned last month, red/raised and slightly tender, applied warm compresses, mass drained and has now resolved; has slight residual purplish discoloration. Also developed red slightly raised rash in the breast fold; applied nystatin Triamolone cream with great improvement; would like a refill. She states that she does perform routine self breast evaluations and has not noticed any new abnormalities such as masses, skin changes, color changes, nipple discharge, or changes to the nipple-areolar complex. She has never required a breast biopsy. She has a family history of breast cancer in her mother, sister, and niece. Diet: regular, tries to eat a healthy diet and avoids carbs and fatty foods  Alcohol: rarely  Exercise: walks 10,000+ steps daily  Sleep: about 8 hours a night  Caffeine: has one cup of coffee every am    INTERVAL HISTORY:  Bilateral screening mammogram 3/4/2021:  No concerning findings suggestive malignancy. BI-RADS 1. Bilateral diagnostic mammogram and left breast ultrasound 1/13/2022:  Patient's palpable finding corresponds to a 0.4 x 0.2 cm subcutaneous  lesion at 7 o'clock, left cm from left nipple. This may represent an  infected subcutaneous inflammatory lesion  or a sebaceous cyst.  Overlying focal skin redness/lesion noted. According to patient she has had this for 1-1/2 weeks. Clinical follow-up advised.   2. No mammographic evidence of malignancy. BI-RADS 2    Review of Systems    Past Medical History:   Diagnosis Date    Allergic rhinitis     saw allergist, dr Lyman Boards of years. had allergy shots in the past    Breast mass in female 2022    FH: breast cancer 2021 breast mom and sister. History of adenomatous polyp of colon 2017    Hyperlipidemia LDL goal < 130     Hypertension     Osteopenia     Osteoporosis     Prediabetes        Past Surgical History:   Procedure Laterality Date    WISDOM TOOTH EXTRACTION         Allergies as of 2022    (No Known Allergies)       Social History     Tobacco Use    Smoking status: Never    Smokeless tobacco: Never   Vaping Use    Vaping Use: Never used   Substance Use Topics    Alcohol use: Not Currently     Comment: rarely    Drug use: Never       Menstrual History:  Menarche age: 12  . Age first live birth: 23  Breastfeed: no  Postmenopausal  Natural menopause     Oral contraceptive use: yes for about 15 years and is not still using  Hormone replacement therapy use: no      Breast density: Heterogeneously dense   Ashkenazi Judaism Heritage: no   Genetic testing: the patient has not but her sister Jennifer Cabello) has and was negative     Family history significant for breast and ovarian cancer: Mother Breast Cancer, dx [de-identified]  at 80  Sister Jennifer Cabello) Breast Cancer, age of dx 70  Niece (Vera Poole, daughter of Sheila Parekh) Breast Cancer, age of dx 36  at 37  Niece had a tumor on her neck at age 15 then Lung Cancer at 23  at 25  Brother Oly Lao 25, age of dx ???     Lifetime risk for breast cancer 16.4%    [unfilled]  Medication documentation has been reviewed in the electronic medical record and patient office intake form. REVIEW OF SYSTEMS:  Review of Systems   Constitutional: Negative for unexpected weight change. Eyes: Negative for visual disturbance. Respiratory: Negative for cough and shortness of breath.     Cardiovascular: Negative for chest pain and palpitations. Gastrointestinal: Negative for abdominal pain. Musculoskeletal: Negative for arthralgias and myalgias. Neurological: Negative for headaches. Hematological: Negative for adenopathy. Does not bruise/bleed easily. Psychiatric/Behavioral: Negative for dysphoric mood. The patient is not nervous/anxious. EXAM:  /74   Pulse 83   Resp 18   Ht 5' 6\" (1.676 m)   Wt 185 lb 12.8 oz (84.3 kg)   SpO2 97%   BMI 29.99 kg/m²   Physical Exam  Constitutional: She appearswell-nourished. No apparent distress. Breast: The patient was examined in the upright and supine position. Breasts are symmetrically ptotic. Right: No new masses or changes in breast contour. No skin changes of the breast or nipple areolar complex. No nipple inversion or discharge. No erythema, thickening (peau d'orange), or dimpling. Left: Slight purple discoloration and firmness at 6:00 superior to IMF. No other masses or changes in breast contour. No skin changes of the breast or nipple areolar complex. No nipple inversion or discharge. No skin thickening (peau d'orange), or dimpling. There is no axillary lymphadenopathy palpated bilaterally. Head: Normocephalic and atraumatic:   Eyes: EOM are normal. Pupils are equal, round, and reactive to light. Neck: Neck supple. No tracheal deviation present. No obvious mass. Lymphatics: No palpable supraclavicular, cervical, or axillary lymphadenopathy  Skin: No rash noted. No erythema. Neurologic: alert and oriented. Extremities: appear well perfused. Risk assessment using [facility] Breast Cancer Risk Evaluation Tool to evaluate her risk compared to the general population. Her lifetime risk for breast cancer is 16.4% (general population average 7%). 20% or greater is considered to be high risk for breast cancer.      ASSESSMENT:   Left Breast Skin lesion - probable inflamed epidermoid cyst with recurrence based on clinical exam findings and recent breast imaging findings. Improved and inflammation resolved. Intertrigo - left IMF   Breast Pain - secondary to above. Resolved. Family history of breast cancer-mother, sister, niece     PLAN:   Reviewed pathophysiology of epidermoid cysts   Call the office for any of the concerning symptoms: worsening pain/tenderness, increased firmness, warmth, swelling, skin changes, fever and/or chills. Prescription for nystatin triamsilone cream sent to pharmacy per patient request.    Recommend to keep bilateral breast inframammary folds clean and dry. If area becomes reddened and irritated, can try antifungal powder. Can also try [employer] with Antimicrobial Silver. Apply 1-2 times daily underneath left breast.  Change if dressing becomes soiled. Keep area clean and dry. Wash with mild soap and pat gently dry. Most recent breast imaging reviewed, discussed with the patient, and documented above. Discussed the importance of breast awareness including the importance and technique of self breast exams  Annual screening mammography due 1/2023  Follow up after mammogram or earlier if needed. All of the patient's questions were answered at this time however, she was encouraged to call the office with any further inquiries. Approximately 25 minutes of time were spent in preparation, direct patient contact, counseling, care coordination, documentation and activities otherwise related to this encounter.           Dhara Curtis, EDUARDO-CNP  [facility])   Surgical Breast Oncology   [phone]

## 2022-08-12 NOTE — PATIENT INSTRUCTIONS
Healthy Lifestyle Recommendations: healthy diet (decrease consumption of red meat, increase fresh fruits and vegetables), decreased alcohol consumption (less than 4 drinks/week), adequate sleep (goal 6-8 hours), routine exercise (goal 150 minutes/week or greater), weight control. Patient Education        Breast Self-Exam: Care Instructions  Your Care Instructions     A breast self-exam is when you check your breasts for lumps or changes. This regular exam helps you learn how your breasts normally look and feel. Mostbreast problems or changes are not because of cancer. Breast self-exam is not a substitute for a mammogram. Having regular breast exams by your doctor and regular mammograms improve your chances of finding anyproblems with your breasts. Some women set a time each month to do a step-by-step breast self-exam. Other women like a less formal system. They might look at their breasts as they brushtheir teeth, or feel their breasts once in a while in the shower. If you notice a change in your breast, tell your doctor. Follow-up care is a key part of your treatment and safety. Be sure to make and go to all appointments, and call your doctor if you are having problems. It's also a good idea to know your test results and keep alist of the medicines you take. How do you do a breast self-exam?  The best time to examine your breasts is usually one week after your menstrual period begins. Your breasts should not be tender then. If you do not have periods, you might do your exam on a day of the month that is easy to remember. To examine your breasts:  Remove all your clothes above the waist and lie down. When you are lying down, your breast tissue spreads evenly over your chest wall, which makes it easier to feel all your breast tissue. Use the pads--not the fingertips--of the 3 middle fingers of your left hand to check your right breast. Move your fingers slowly in small coin-sized circles that overlap.   Use three levels of pressure to feel of all your breast tissue. Use light pressure to feel the tissue close to the skin surface. Use medium pressure to feel a little deeper. Use firm pressure to feel your tissue close to your breastbone and ribs. Use each pressure level to feel your breast tissue before moving on to the next spot. Check your entire breast, moving up and down as if following a strip from the collarbone to the bra line, and from the armpit to the ribs. Repeat until you have covered the entire breast.  Repeat this procedure for your left breast, using the pads of the 3 middle fingers of your right hand. To examine your breasts while in the shower:  Place one arm over your head and lightly soap your breast on that side. Using the pads of your fingers, gently move your hand over your breast (in the strip pattern described above), feeling carefully for any lumps or changes. Repeat for the other breast.  Have your doctor inspect anything you notice to see if you need further testing. Where can you learn more? Go to https://[employer]. org and sign in to your [employer] account. Enter P148 in the [facility] box to learn more about \"Breast Self-Exam: Care Instructions. \"     If you do not have an account, please click on the \"Sign Up Now\" link. Current as of: September 8, 2021               Content Version: 13.3  © 7268-5024 Healthwise, Incorporated. Care instructions adapted under license by [facility] ([facility]). If you have questions about a medical condition or this instruction, always ask your healthcare professional. [place] [address] any warranty or liability for your use of this information.

## 2022-08-19 ENCOUNTER — HOSPITAL ENCOUNTER (OUTPATIENT)
Dept: WOMENS IMAGING | Age: 67
Discharge: HOME | End: 2022-08-19
Payer: COMMERCIAL

## 2022-08-19 DIAGNOSIS — M85.80 OSTEOPENIA, UNSPECIFIED LOCATION: ICD-10-CM

## 2022-08-19 PROCEDURE — 77080 DXA BONE DENSITY AXIAL: CPT

## 2022-10-03 DIAGNOSIS — I10 HYPERTENSION, UNSPECIFIED TYPE: ICD-10-CM

## 2022-10-03 RX ORDER — DILTIAZEM HYDROCHLORIDE 240 MG/1
CAPSULE, COATED, EXTENDED RELEASE ORAL
Qty: 90 CAPSULE | Refills: 0 | Status: SHIPPED | OUTPATIENT
Start: 2022-10-03

## 2022-10-03 NOTE — TELEPHONE ENCOUNTER
Pt called in requesting a refill of her dilTIAZem (CARDIZEM CD) 240 MG extended release capsule to be called into [facility] in [place].  [phone]

## 2022-10-03 NOTE — TELEPHONE ENCOUNTER
Have filled 3 months. Due follow up nov.  Have pt call for new pcp  MD Hanna Macdonald MD  [facility] internal Medicine  [phone] [facility] [address]  [address] scheduling  [phone]

## 2023-01-02 DIAGNOSIS — I10 HYPERTENSION, UNSPECIFIED TYPE: ICD-10-CM

## 2023-01-03 RX ORDER — DILTIAZEM HYDROCHLORIDE 240 MG/1
240 CAPSULE, COATED, EXTENDED RELEASE ORAL DAILY
Qty: 90 CAPSULE | Refills: 0 | Status: SHIPPED | OUTPATIENT
Start: 2023-01-03 | End: 2023-01-19 | Stop reason: SDUPTHER

## 2023-01-03 RX ORDER — DILTIAZEM HYDROCHLORIDE 240 MG/1
CAPSULE, COATED, EXTENDED RELEASE ORAL
Qty: 90 CAPSULE | Refills: 0 | OUTPATIENT
Start: 2023-01-03

## 2023-01-03 NOTE — TELEPHONE ENCOUNTER
Called and informed former dr. Keshav Ramos pt to schedule with a new doctor to get medications refilled.

## 2023-01-03 NOTE — TELEPHONE ENCOUNTER
Pt is asking if this can be called in. Pt has an appt made for 02-06-23 in [place]. Please advise.      Patient is calling requesting refills for:    Medication:   Requested Prescriptions     Refused Prescriptions Disp Refills    dilTIAZem (CARDIZEM CD) 240 MG extended release capsule [Pharmacy Med Name: DILTIAZEM 24H ER(CD) 240 MG CP] 90 capsule 0     Sig: TAKE 1 CAPSULE BY MOUTH EVERY DAY     Refused By: [facility]     Reason for Refusal: Patient no longer under prescriber care         Patient Phone Number: [phone] (home) [phone] (work)    Last appt: 5/31/2022   Next appt: 02-06-23 at [address] [place] [address] Avenue:   [address] [place] [address]  Phone: [phone] Fax: [phone]

## 2023-01-19 ENCOUNTER — HOSPITAL ENCOUNTER (OUTPATIENT)
Dept: GENERAL RADIOLOGY | Age: 68
Discharge: HOME | End: 2023-01-19
Payer: COMMERCIAL

## 2023-01-19 ENCOUNTER — OFFICE VISIT (OUTPATIENT)
Dept: FAMILY MEDICINE CLINIC | Age: 68
End: 2023-01-19
Payer: COMMERCIAL

## 2023-01-19 ENCOUNTER — HOSPITAL ENCOUNTER (OUTPATIENT)
Age: 68
Discharge: HOME | End: 2023-01-19
Payer: COMMERCIAL

## 2023-01-19 VITALS
DIASTOLIC BLOOD PRESSURE: 90 MMHG | WEIGHT: 188.4 LBS | BODY MASS INDEX: 30.28 KG/M2 | TEMPERATURE: 97.4 F | HEIGHT: 66 IN | OXYGEN SATURATION: 97 % | SYSTOLIC BLOOD PRESSURE: 140 MMHG | HEART RATE: 96 BPM

## 2023-01-19 DIAGNOSIS — M54.2 NECK PAIN: ICD-10-CM

## 2023-01-19 DIAGNOSIS — S16.1XXA STRAIN OF NECK MUSCLE, INITIAL ENCOUNTER: ICD-10-CM

## 2023-01-19 DIAGNOSIS — R73.03 PREDIABETES: ICD-10-CM

## 2023-01-19 DIAGNOSIS — I10 HYPERTENSION, UNSPECIFIED TYPE: ICD-10-CM

## 2023-01-19 DIAGNOSIS — R73.03 PREDIABETES: Primary | ICD-10-CM

## 2023-01-19 LAB
A/G RATIO: 1.3 (ref 1.1–2.2)
ALBUMIN SERPL-MCNC: 4.4 G/DL (ref 3.4–5)
ALP BLD-CCNC: 128 U/L (ref 40–129)
ALT SERPL-CCNC: 14 U/L (ref 10–40)
ANION GAP SERPL CALCULATED.3IONS-SCNC: 14 MMOL/L (ref 3–16)
AST SERPL-CCNC: 13 U/L (ref 15–37)
BILIRUB SERPL-MCNC: 0.4 MG/DL (ref 0–1)
BUN BLDV-MCNC: 18 MG/DL (ref 7–20)
CALCIUM SERPL-MCNC: 10.1 MG/DL (ref 8.3–10.6)
CHLORIDE BLD-SCNC: 100 MMOL/L (ref 99–110)
CO2: 21 MMOL/L (ref 21–32)
CREAT SERPL-MCNC: 0.9 MG/DL (ref 0.6–1.2)
ESTIMATED AVERAGE GLUCOSE: 134.1 MG/DL
GFR SERPL CREATININE-BSD FRML MDRD: >60 ML/MIN/{1.73_M2}
GLUCOSE BLD-MCNC: 92 MG/DL (ref 70–99)
HBA1C MFR BLD: 6.3 %
POTASSIUM SERPL-SCNC: 4.3 MMOL/L (ref 3.5–5.1)
SODIUM BLD-SCNC: 135 MMOL/L (ref 136–145)
TOTAL PROTEIN: 7.8 G/DL (ref 6.4–8.2)

## 2023-01-19 PROCEDURE — 1090F PRES/ABSN URINE INCON ASSESS: CPT | Performed by: NURSE PRACTITIONER

## 2023-01-19 PROCEDURE — G8427 DOCREV CUR MEDS BY ELIG CLIN: HCPCS | Performed by: NURSE PRACTITIONER

## 2023-01-19 PROCEDURE — 1123F ACP DISCUSS/DSCN MKR DOCD: CPT | Performed by: NURSE PRACTITIONER

## 2023-01-19 PROCEDURE — 3017F COLORECTAL CA SCREEN DOC REV: CPT | Performed by: NURSE PRACTITIONER

## 2023-01-19 PROCEDURE — 1036F TOBACCO NON-USER: CPT | Performed by: NURSE PRACTITIONER

## 2023-01-19 PROCEDURE — 99214 OFFICE O/P EST MOD 30 MIN: CPT | Performed by: NURSE PRACTITIONER

## 2023-01-19 PROCEDURE — G8484 FLU IMMUNIZE NO ADMIN: HCPCS | Performed by: NURSE PRACTITIONER

## 2023-01-19 PROCEDURE — G8417 CALC BMI ABV UP PARAM F/U: HCPCS | Performed by: NURSE PRACTITIONER

## 2023-01-19 PROCEDURE — G8399 PT W/DXA RESULTS DOCUMENT: HCPCS | Performed by: NURSE PRACTITIONER

## 2023-01-19 PROCEDURE — 72040 X-RAY EXAM NECK SPINE 2-3 VW: CPT

## 2023-01-19 PROCEDURE — 3077F SYST BP >= 140 MM HG: CPT | Performed by: NURSE PRACTITIONER

## 2023-01-19 PROCEDURE — 3080F DIAST BP >= 90 MM HG: CPT | Performed by: NURSE PRACTITIONER

## 2023-01-19 RX ORDER — DILTIAZEM HYDROCHLORIDE 240 MG/1
240 CAPSULE, COATED, EXTENDED RELEASE ORAL DAILY
Qty: 90 CAPSULE | Refills: 1 | Status: SHIPPED | OUTPATIENT
Start: 2023-01-19 | End: 2023-04-19

## 2023-01-19 RX ORDER — PREDNISONE 10 MG/1
10 TABLET ORAL DAILY
Qty: 10 TABLET | Refills: 0 | Status: SHIPPED | OUTPATIENT
Start: 2023-01-19 | End: 2023-01-29

## 2023-01-19 RX ORDER — TIZANIDINE 4 MG/1
4 TABLET ORAL 3 TIMES DAILY
Qty: 30 TABLET | Refills: 1 | Status: SHIPPED | OUTPATIENT
Start: 2023-01-19

## 2023-01-19 SDOH — HEALTH STABILITY: PHYSICAL HEALTH: ON AVERAGE, HOW MANY DAYS PER WEEK DO YOU ENGAGE IN MODERATE TO STRENUOUS EXERCISE (LIKE A BRISK WALK)?: 5 DAYS

## 2023-01-19 SDOH — HEALTH STABILITY: PHYSICAL HEALTH: ON AVERAGE, HOW MANY MINUTES DO YOU ENGAGE IN EXERCISE AT THIS LEVEL?: 30 MIN

## 2023-01-19 ASSESSMENT — PATIENT HEALTH QUESTIONNAIRE - PHQ9
SUM OF ALL RESPONSES TO PHQ QUESTIONS 1-9: 0
SUM OF ALL RESPONSES TO PHQ QUESTIONS 1-9: 0
SUM OF ALL RESPONSES TO PHQ9 QUESTIONS 1 & 2: 0
1. LITTLE INTEREST OR PLEASURE IN DOING THINGS: 0
SUM OF ALL RESPONSES TO PHQ QUESTIONS 1-9: 0
SUM OF ALL RESPONSES TO PHQ QUESTIONS 1-9: 0
2. FEELING DOWN, DEPRESSED OR HOPELESS: 0

## 2023-01-19 ASSESSMENT — SOCIAL DETERMINANTS OF HEALTH (SDOH)

## 2023-01-19 ASSESSMENT — ENCOUNTER SYMPTOMS
SHORTNESS OF BREATH: 0
ABDOMINAL PAIN: 0
COUGH: 0

## 2023-01-19 NOTE — LETTER
[place] [facility] AND [facility]'S [facility]  [address] [address]  Phone: [phone]  Fax: [phone]    EDUARDO Baez NP        January 19, 2023     Patient: Verna Damon   YOB: 1955   Date of Visit: 1/19/2023       To Whom It May Concern:     Josias Vuong be off work until 1/23/2023. 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 If you have any questions or concerns, please don't hesitate to call.     Sincerely,        EDUARDO Baez NP

## 2023-01-19 NOTE — PROGRESS NOTES
Judy Marcos (:  1955) is a 79 y.o. female,Established patient, here for evaluation of the following chief complaint(s):  New Patient (To get established and BP med refill) and Neck Pain (10 years ago did something to her neck after lifting a heavy box. Was put on something for inflammation and it helped. Left side of neck pain and pulls into her left shoulder. Took the week off of work due to work aggravating the pain)         ASSESSMENT/PLAN:  1. Hypertension, unspecified type  Not controlled in office today. May be realted to not taking, medication or neck pain. Recommend taking BP medication daily. Record BP at home daily and bring to next visit. Refilled medication today. Follow DASH diet. - dilTIAZem (CARDIZEM CD) 240 MG extended release capsule; Take 1 capsule by mouth daily  Dispense: 90 capsule; Refill: 1  - Comprehensive Metabolic Panel; Future    2. Prediabetes  Ongoing, needs repeat labs. - Hemoglobin A1C; Future    3. Strain of neck muscle, initial encounter  Worsening, take medications as prescribed. 4. Neck pain  Ongoing, worsening. Get imaging.   - Cervical spine xray       Return in about 19 weeks (around 2023) for yearly physical.         Subjective   SUBJECTIVE/OBJECTIVE:  HPI  Presents today to establish care. PMH significant for prediabetes, HTN, osteoporosis, tubular adenoma, family HX of breast ca. 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 States pain started Friday evening. Feels like a pulled muscle, startes at the base of the neck, radiates down into left shoulder. Limited ROM of left shoulder. \"Feels like a pinched nerve\". HTN-does not check BP at home. Does not add salt to food. Eats frozen vegtables. Avoids procressed. Denies cardiac complaints. Has not had bp medication today. Prediabetes-avoids sugary foods, low carbs.  Staes she walks a lot at work and is physically active with work.   Current Outpatient Medications   Medication Sig Dispense Refill    dilTIAZem (CARDIZEM CD) 240 MG extended release capsule Take 1 capsule by mouth daily 90 capsule 1    tiZANidine (ZANAFLEX) 4 MG tablet Take 1 tablet by mouth 3 times daily 30 tablet 1    predniSONE (DELTASONE) 10 MG tablet Take 1 tablet by mouth daily for 10 days 10 tablet 0    fluticasone (FLONASE) 50 MCG/ACT nasal spray 2 puffs per nose daily 16 g 5    montelukast (SINGULAIR) 10 MG tablet Take 1 tablet by mouth nightly 90 tablet 3    atorvastatin (LIPITOR) 40 MG tablet Take 1 tablet by mouth daily 90 tablet 3    Ibuprofen (ADVIL PO) Take by mouth      cetirizine (ZYRTEC) 10 MG tablet Take 10 mg by mouth as needed for Allergies. vitamin D (CHOLECALCIFEROL) 1000 UNIT TABS tablet Take 1,000 Units by mouth daily. Omega-3 Fatty Acids (FISH OIL PO) Take  by mouth. aspirin 81 MG tablet Take 81 mg by mouth daily. No current facility-administered medications for this visit. Past Medical History:   Diagnosis Date    Allergic rhinitis     saw allergist, dr Rafi Sancehz of years. had allergy shots in the past    Breast mass in female 1/6/2022    FH: breast cancer 6/1/2021    6.2021 breast mom and sister. History of adenomatous polyp of colon 4/17/2017    Hyperlipidemia LDL goal < 130     Hypertension     Osteopenia     Osteoporosis     Prediabetes         Review of Systems   Constitutional:  Positive for activity change. Respiratory:  Negative for cough and shortness of breath. Cardiovascular:  Negative for chest pain, palpitations and leg swelling. Gastrointestinal:  Negative for abdominal pain. Genitourinary:  Negative for difficulty urinating. Musculoskeletal:  Positive for arthralgias and neck pain. Skin: Negative. Neurological:  Negative for dizziness, weakness and headaches. Objective   Physical Exam  Constitutional:       General: She is not in acute distress.   HENT:      Head: Normocephalic and atraumatic. Right Ear: Tympanic membrane, ear canal and external ear normal.      Left Ear: Tympanic membrane, ear canal and external ear normal.      Mouth/Throat:      Mouth: Mucous membranes are moist.      Pharynx: Oropharynx is clear. Eyes:      Extraocular Movements: Extraocular movements intact. Conjunctiva/sclera: Conjunctivae normal.      Pupils: Pupils are equal, round, and reactive to light. Neck:      Thyroid: No thyromegaly. Vascular: No carotid bruit. Cardiovascular:      Rate and Rhythm: Normal rate and regular rhythm. Heart sounds: Normal heart sounds. Pulmonary:      Effort: Pulmonary effort is normal.      Breath sounds: Normal breath sounds. Abdominal:      General: Bowel sounds are normal.      Palpations: Abdomen is soft. There is no mass. Tenderness: There is no abdominal tenderness. There is no right CVA tenderness. Musculoskeletal:      Left shoulder: Tenderness present. Decreased range of motion. Cervical back: Tenderness (left side into left shoulder) present. Right lower leg: No edema. Left lower leg: No edema. Lymphadenopathy:      Cervical: No cervical adenopathy. Skin:     General: Skin is warm and dry. Neurological:      Mental Status: She is alert.    Psychiatric:         Mood and Affect: Mood normal.            --EDUARDO Pinon - DALJIT

## 2023-01-23 ENCOUNTER — TELEPHONE (OUTPATIENT)
Dept: FAMILY MEDICINE CLINIC | Age: 68
End: 2023-01-23

## 2023-01-24 ENCOUNTER — TELEPHONE (OUTPATIENT)
Dept: FAMILY MEDICINE CLINIC | Age: 68
End: 2023-01-24

## 2023-01-24 DIAGNOSIS — M50.30 DDD (DEGENERATIVE DISC DISEASE), CERVICAL: Primary | ICD-10-CM

## 2023-01-24 NOTE — TELEPHONE ENCOUNTER
Pt was seen for her neck and she is now returned back to work. 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 Pt stated that she will come in the office and pick this up.      Pl advise [phone] (home) [phone] (work)

## 2023-01-24 NOTE — TELEPHONE ENCOUNTER
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

## 2023-01-24 NOTE — LETTER
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 If you have any questions or concerns, please don't hesitate to call.     Sincerely,        Clayborne Harada, APRN - NP

## 2023-01-24 NOTE — Clinical Note
[place] [employer] OF [facility] AND WOMEN'S [facility]  [address] [address]  Phone: [phone]  Fax: [phone]    EDUARDO Galvez NP        January 24, 2023    [address]      Dear Charis Britton:    ***    If you have any questions or concerns, please don't hesitate to call.     Sincerely,        EDUARDO Galvez NP

## 2023-01-24 NOTE — Clinical Note
[employer] [facility] AND [facility]OMEN'S [facility]  [address] [address]  Phone: [phone]  Fax: [phone]    EDUARDO Esquivel NP        January 24, 2023     Patient: Gerardo Ricketts   YOB: 1955   Date of Visit: 1/24/2023       To Whom It May Concern: It is my medical opinion that Migdalia Swanson {participation release, (activity restriction):00344}. If you have any questions or concerns, please don't hesitate to call.     Sincerely,        EDUARDO Esquivel NP

## 2023-02-01 ENCOUNTER — HOSPITAL ENCOUNTER (OUTPATIENT)
Dept: PHYSICAL THERAPY | Age: 68
Setting detail: THERAPIES SERIES
Discharge: HOME | End: 2023-02-01
Payer: COMMERCIAL

## 2023-02-01 DIAGNOSIS — Z78.9 DECREASED ACTIVITIES OF DAILY LIVING (ADL): Primary | ICD-10-CM

## 2023-02-01 PROCEDURE — 97110 THERAPEUTIC EXERCISES: CPT

## 2023-02-01 PROCEDURE — 97161 PT EVAL LOW COMPLEX 20 MIN: CPT

## 2023-02-01 PROCEDURE — 97140 MANUAL THERAPY 1/> REGIONS: CPT

## 2023-02-01 NOTE — FLOWSHEET NOTE
[facility] [address] [place] [place] [address]  Phone: [phone]   Fax:     [phone]    Physical Therapy Treatment Note/ Progress Report:     Date:  2023    Patient Name:  Narda Irizarry    :  1955  MRN: [id number]    Pertinent Medical History:      Referring Provider:   DREW Rodriguez                            Evaluation Date: 2023                                                   Medical Diagnosis:  DDD (degenerative disc disease), cervical [M50.30]     Treatment Diagnosis:      ICD-10-CM     1. Decreased activities of daily living (ADL)  Z78.9                                      Insurance information: [facility] Choice- allowed 60/yr  Plan of care signed (Y/N): N    Date of Patient follow up with Physician:      Progress Report: []  Yes  [x]  No     Date Range for reporting period:  Beginnin2023  Ending:     Progress report due (10 Rx/or 30 days whichever is less):     Recertification due (POC duration/ or 90 days whichever is less):     Visit # Insurance/POC Allowable Auth Needed   1 12 []Yes    [x]No     Functional Outcomes Measure:     Test:NDI  Score: 23 5 raw; 10% dysfunction    Pain level:  2-8/10 L neck    History of Injury:Patient stated she has lower cervical spine and L scapular pain, L suboccipital.  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 Pain started 20 years ago and has gradually become worse. Pt took one week off of steroid and took an oral steroid and muscle relaxer 1 week ago and pain reduced. Pt has L sided headaches occasionally which \"starts with stress. \"  Pt has pain when lifting L UE overhead to lift items overhead and to fix her hair.        SUBJECTIVE:  See eval    OBJECTIVE:   Observation:   Test measurements:      CERV ROM       Cervical Flexion 35     Cervical Extension 55       Left Right   Cervical SB 25 35   Cervical rotation 48 68     UE Strength  Left Right   Shoulder Flex 4/5 4/5   Shoulder Scap       Shoulder ABd (C5 Axillary) 4/5 4/5       RESTRICTIONS/PRECAUTIONS: osteopenia    Exercises/Interventions:   Therapeutic Ex (93654)  Min: Resistance/Repetitions Notes                                           Manual Intervention (62554)  Min: Avoid manipulation- osteopenia                   Rib mobilizations Grade IV L first rib    STM L neck L scalenes/lev scap         NMR re-education (84346)  Min:               Therapeutic Activity (93180)  Min:               Modalities  Min:                  Other Therapeutic Activities:  Pt was educated on PT POC, Diagnosis, Prognosis, pathomechanics as well as frequency and duration of scheduling future physical therapy appointments. Time was also taken on this day to answer all patient questions and participation in PT. Reviewed appointment policy in detail with patient and patient verbalized understanding. Home Exercise Program:  Access Code: [email or web address]  URL: [email or web address]  Date: 02/01/2023  Prepared by: Ladan Camp    Exercises  Supine Chin Tuck - 1 x daily - 7 x weekly - 10 reps - 5 hold  Standing Shoulder Shrugs - 1 x daily - 7 x weekly - 10 reps - 5 hold  Standing Scapular Retraction - 1 x daily - 7 x weekly - 10 reps - 5 hold  Standing Shoulder Posterior Capsule Stretch - 1 x daily - 7 x weekly - 1 reps - 30 hold  Doorway Pec Stretch at 90 Degrees Abduction - 1 x daily - 7 x weekly - 3 reps - 15 hold      Therapeutic Exercise and NMR EXR  [] (71965) Provided verbal/tactile cueing for activities related to strengthening, flexibility, endurance, ROM  for improvements in cervical, postural, scapular, scapulothoracic and UE control with self care, reaching, carrying, lifting, house/yardwork, driving/computer work.    [] (58122) Provided verbal/tactile cueing for activities related to improving balance, coordination, kinesthetic sense, posture, motor skill, proprioception  to assist with cervical, scapular, scapulothoracic and UE control with self care, reaching, carrying, lifting, house/yardwork, driving/computer work. Therapeutic Activities:    [] (78998 or 97534) Provided verbal/tactile cueing for activities related to improving balance, coordination, kinesthetic sense, posture, motor skill, proprioception and motor activation to allow for proper function of cervical, scapular, scapulothoracic and UE control with self care, carrying, lifting, driving/computer work.      Home Exercise Program:    [] (48620) Reviewed/Progressed HEP activities related to strengthening, flexibility, endurance, ROM of cervical, scapular, scapulothoracic and UE control with self care, reaching, carrying, lifting, house/yardwork, driving/computer work  [] (62208) Reviewed/Progressed HEP activities related to improving balance, coordination, kinesthetic sense, posture, motor skill, proprioception of cervical, scapular, scapulothoracic and UE control with self care, reaching, carrying, lifting, house/yardwork, driving/computer work      Manual Treatments:  PROM / STM / Oscillations-Mobs:  G-I, II, III, IV (PA's, Inf., Post.)  [] (94006) Provided manual therapy to mobilize soft tissue/joints of cervical/CT, scapular GHJ and UE for the purpose of decreasing headache, modulating pain, promoting relaxation,  increasing ROM, reducing/eliminating soft tissue swelling/inflammation/restriction, improving soft tissue extensibility and allowing for proper ROM for normal function with self care, reaching, carrying, lifting, house/yardwork, driving/computer work      Charges:  Timed Code Treatment Minutes: 25   Total Treatment Minutes: 45     [x] EVAL (LOW) 42840 (typically 20 minutes face-to-face)  [] EVAL (MOD) 00926 (typically 30 minutes face-to-face)  [] EVAL (HIGH) 54314 (typically 45 minutes face-to-face)  [] RE-EVAL     [x] LK(37764) x     [] Dry needle 1 or 2 Muscles (17429)  [] NMR (29571) x     [] Dry needle 3+ Muscles (43085)  [x] Manual (44101) x     [] Ultrasound (64485) x  [] TA (20035) x     [] Mech Traction (65892)  [] ES(attended) (77513)     [] ES (un) (20652):   [] Vasopump (98566) [] Ionto (69838)   [] Other:    GOALS:  Patient stated goal: \"no pain\"  [] Progressing: [] Met: [] Not Met: [] Adjusted     Therapist goals for Patient:   Short Term Goals: To be achieved in: 2 weeks  1. Independent in HEP and progression per patient tolerance, in order to prevent re-injury. [] Progressing: [] Met: [] Not Met: [] Adjusted  2. Patient will have a decrease in pain to facilitate improvement in movement, function, and ADLs as indicated by Functional Deficits. [] Progressing: [] Met: [] Not Met: [] Adjusted     Long Term Goals: To be achieved in: 6 weeks  1. Disability index score of 7% or less for the NDI to assist with reaching prior level of function. [] Progressing: [] Met: [] Not Met: [] Adjusted  2. Patient will demonstrate increased AROM to [facility] of cervical/thoracic spine to allow for proper joint functioning as indicated by patients Functional Deficits. [] Progressing: [] Met: [] Not Met: [] Adjusted  3. Patient will demonstrate an increase in postural awareness and control and activation of  Deep cervical stabilizers to allow for proper functional mobility as indicated by patients Functional Deficits. [] Progressing: [] Met: [] Not Met: [] Adjusted  4. Patient will return to comb her hair with the L hand without increased symptoms or restriction. 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   [] Progressing: [] Met: [] Not Met: [] Adjusted         ASSESSMENT:  See eval    Treatment/Activity Tolerance:  [x] Patient tolerated treatment well [] Patient limited by fatique  [] Patient limited by pain  [] Patient limited by other medical complications  [] Other:     Overall Progression Towards Functional goals/ Treatment Progress Update:  [] Patient is progressing as expected towards functional goals listed. [] Progression is slowed due to complexities/Impairments listed. [] Progression has been slowed due to co-morbidities. [x] Plan just implemented, too soon to assess goals progression <30days   [] Goals require adjustment due to lack of progress  [] Patient is not progressing as expected and requires additional follow up with physician  [] Other    Prognosis for POC: [x] Good [] Fair  [] Poor    Patient requires continued skilled intervention: [x] Yes  [] No        PLAN: See eval. Begin t-slide scapular exercises and chin tuck progression  [] Continue per plan of care [] Alter current plan (see comments)  [x] Plan of care initiated [] Hold pending MD visit [] Discharge    Electronically signed by: Jordy Flores PT , OMT-C, [id number]      Note: If patient does not return for scheduled/recommended follow up visits, this note will serve as a discharge from care along with the most recent update on progress.

## 2023-02-01 NOTE — PROGRESS NOTES
[facility] [address] [address]  Phone: [phone]   Fax:     [phone]        Physical Therapy Certification    Dear DREW Green,    We had the pleasure of evaluating the following patient for physical therapy services at [facility]. A summary of our findings can be found in the initial assessment below. This includes our plan of care. If you have any questions or concerns regarding these findings, please do not hesitate to contact me at the office phone number checked above. Thank you for the referral.       Physician Signature:_______________________________Date:__________________  By signing above (or electronic signature), therapists plan is approved by physician        Patient: Vern Cuellar   : 1955   MRN: [id number]  Referring Provider:   DREW Green     Evaluation Date: 2023              Medical Diagnosis:  DDD (degenerative disc disease), cervical [M50.30]    Treatment Diagnosis:    ICD-10-CM    1. Decreased activities of daily living (ADL)  Z78.9                                    Insurance information: [facility] Choice- allowed 60/yr    Precautions/ Contra-indications: Osteopenia  Latex Allergy:  [x]NO      []YES  Preferred Language for Healthcare:   [x]English       []other:    C-SSRS Triggered by Intake questionnaire (Past 2 wk assessment ):   [x] No, Questionnaire did not trigger screening.   [] Yes, Patient intake triggered C-SSRS Screening      [] C-SSRS Screening completed  [] PCP notified via Epic     SUBJECTIVE: Patient stated she has lower cervical spine and L scapular pain, L suboccipital.  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 Pain started 20 years ago and has gradually become worse.   Pt took one week off of steroid and took an oral steroid and muscle relaxer 1 week ago and pain reduced. Pt has L sided headaches occasionally which \"starts with stress. \"  Pt has pain when lifting L UE overhead to lift items overhead and to fix her hair. Relevant Medical History:see below  Functional Disability Index: NDI 5; 10% dysfunction    Pain Scale: 2-8/10  Easing factors: rest, heat  Provocative factors: overhead movements    Type: [x]Constant   []Intermittent  []Radiating []Localized []other:     Numbness/Tingling: numbness R LE \"for years\"    Occupation/School: fork  (standing up position)    Living Status/Prior Level of Function: Independent with ADLs and IADLs    OBJECTIVE:   Posture: Forward head, neck SB R and Rot L minimally; increased kyphosis thoracic    Functional Mobility/Transfers: I    Palpation: TTP L levator scapula and scalenes    Bandages/Dressings/Incisions: NA    Gait: (include devices/WB status):  WNL     CERV ROM     Cervical Flexion 35    Cervical Extension 55     Left Right   Cervical SB 25 35   Cervical rotation 48 68   Quadrant (-) (-)   Dorsal Glide      UE ROM Left- WFL Right- [facility]   Shoulder Flex     Shoulder Abd     Shoulder ER     Shoulder IR     Elbow flex/ext     Wrist flex/ext/pro/sup     Finger flex/ext/opposition     Shoulder AROM WNL w OP     UE Strength  Left Right   Shoulder Flex 4/5 4/5   Shoulder Scap     Shoulder ABd (C5 Axillary) 4/5 4/5   Shoulder ER  5/5 5/5   Shoulder IR 5/5 5/5   Elbow Flex (C5 Musc) 5/5 5/5   Elbow Ext (C7 Radial) 5/5 5/5   Wrist Flex (C6 Radial) 5/5 5/5   Wrist Ext (C7 Radial) 5/5 5/5   Finger flex (C8 median)     Finger ext (C7 Radial-PIN)     APB (T1 Median)     Finger Abd (T1 Ulnar)     UE myotomes WNL        Reflexes Normal Abnormal Comments         Cisneros normal     S1-2 Seated achilles [x] []    S1-2 Prone knee bend [] []    L3-4 Patellar tendon [x] []    C5-6 Biceps [x] []    C6 Brachioradialis [x] []    C7-8 Triceps [x] []      Reflexes/Sensation: [x]Dermatomes/Myotomes intact    [x]Reflexes equal and normal bilaterally   []Other:    Joint mobility: elevated L first rib   []Normal    []Hypo   []Hyper      Cluster Testing  Normal Abnormal N/A Comments   Babinski Test [] [] []    Cisneros Test [] [] []    Inverted Sup Sign [] [] []    Alar Ligament Test [] [] []    Transverse Ligament Test [] [] []    Sharp-Jailene Test [] [] []    Hautards Test [] [] []    Vertebral Artery Test [] [] []             Neural dynamic/ Tension testing Normal Abnormal N/A Comments   Spurling Maneuver:  [] [] []    Distraction testing: [] [] []    ULNT [] [] []    Shoulder Abd testing  [] [] []    Cerv Rot/Lat Flex- 1st Rib [] [] []    Deep Neck Flex/endurance testing [] [] []    Craniocerv Flex testing Andrés Gifford [] [] []    End Range Tolerance testing. [] [] []     [] [] []                           [x] Patient history, allergies, meds reviewed. Medical chart reviewed. See intake form. Review Of Systems (ROS):  [x]Performed Review of systems (Integumentary, CardioPulmonary, Neurological) by intake and observation. Intake form has been scanned into medical record. Patient has been instructed to contact their primary care physician regarding ROS issues if not already being addressed at this time.       Co-morbidities/Complexities (which will affect course of rehabilitation):   []None           Arthritic conditions   []Rheumatoid arthritis (M05.9)  []Osteoarthritis (M19.91)   Cardiovascular conditions   [x]Hypertension (I10)  [x]Hyperlipidemia (E78.5)  []Angina pectoris (I20)  []Atherosclerosis (I70)  []CVA Musculoskeletal conditions   []Disc pathology   []Congenital spine pathologies   []Prior surgical intervention  []Osteoporosis (M81.8)  [x]Osteopenia (M85.8)   Endocrine conditions   []Hypothyroid (E03.9)  []Hyperthyroid Gastrointestinal conditions   []Constipation (K42.35)   Metabolic conditions   []Morbid obesity (E66.01)  []Diabetes type 1(E10.65) or 2 (E11.65)   []Neuropathy (G60.9)     Pulmonary conditions   []Asthma (J45)  []Coughing   []COPD (J44.9)   Psychological Disorders  []Anxiety (F41.9)  []Depression (F32.9)   []Other:   [x]Other:     Prediabetes  Breast mass     Barriers to/and or personal factors that will affect rehab potential:              []Age  []Sex   []Smoker              []Motivation/Lack of Motivation                        []Co-Morbidities              []Cognitive Function, education/learning barriers              []Environmental, home barriers              []profession/work barriers  []past PT/medical experience  []other:  Justification:     Falls Risk Assessment (30 days):   [x] Falls Risk assessed and no intervention required.   [] Falls Risk assessed and Patient requires intervention due to being higher risk   TUG score (>12s at risk):     [] Falls education provided, including     ASSESSMENT:   Functional Impairments:     [x]Noted cervical/thoracic/GHJ joint hypomobility   []Noted cervical/thoracic/GHJ joint hypermobility   [x]Decreased cervical/UE functional ROM   [x]Noted Headache pain aggravated by neck movements with/without dizziness   []Abnormal reflexes/sensation/myotomal/dermatomal deficits   [x]Decreased DCF control or ability to hold head up   []Decreased RC/scapular/core strength and neuromuscular control    [x]Decreased UE functional strength   []other:      Functional Activity Limitations (from functional questionnaire and intake)   [x]Reduced ability to tolerate prolonged functional positions   []Reduced ability or difficulty with changes of positions or transfers between positions   [x]Reduced ability to maintain good posture and demonstrate good body mechanics with sitting, bending, and lifting   [x] Reduced ability or tolerance with driving and/or computer work   [x]Reduced ability to perform lifting, reaching, carrying tasks   []Reduced ability to concentrate   []Reduced ability to sleep    [x]Reduced ability to tolerate any impact through UE or spine   []Reduced ability to ambulate prolonged functional periods/distances   []other:    Participation Restrictions   []Reduced participation in self care activities   [x]Reduced participation in home management activities   [x]Reduced participation in work activities   []Reduced participation in social activities. [x]Reduced participation in sport/recreational activities. Classification/Subgrouping:   [x]signs/symptoms consistent with neck pain with mobility deficits     []signs/symptoms consistent with neck pain with movement coordinated impairments    []signs/symptoms consistent with neck pain with radiating pain    [x]signs/symptoms consistent with neck pain with headaches (cervicogenic)    []Signs/symptoms consistent with nerve root involvement including myotome & dermatome dysfunction   []sign/symptoms consistent with facet dysfunction of cervical and thoracic spine    []signs/symptoms consistent suggesting central cord compression/UMN syndromes   []signs/symptoms consistent with discogenic cervical pain   []signs/symptoms consistent with rib dysfunction   []signs/symptoms consistent with postural dysfunction   []signs/symptoms consistent with shoulder pathology    []signs/symptoms consistent with post-surgical status including decreased ROM, strength and function.    []signs/symptoms consistent with pathology which may benefit from Dry Needling   []signs/symptoms which may limit the use of advanced manual therapy techniques: (Elevated CV risk profile, recent trauma, intolerance to end range positions, prior TIA, visual issues, UE neurological compromise )     Prognosis/Rehab Potential:      []Excellent   [x]Good    []Fair   []Poor    Tolerance of evaluation/treatment:    []Excellent   [x]Good    []Fair   []Poor    Physical Therapy Evaluation Complexity Justification  [x] A history of present problem with:  [x] no personal factors and/or comorbidities that impact the plan of care;  []1-2 personal factors and/or comorbidities that impact the plan of care  []3 personal factors and/or comorbidities that impact the plan of care  [x] An examination of body systems using standardized tests and measures addressing any of the following: body structures and functions (impairments), activity limitations, and/or participation restrictions;:  [x] a total of 1-2 or more elements   [] a total of 3 or more elements   [] a total of 4 or more elements   [x] A clinical presentation with:  [x] stable and/or uncomplicated characteristics   [] evolving clinical presentation with changing characteristics  [] unstable and unpredictable characteristics;   [x] Clinical decision making of [] low, [] moderate, [] high complexity using standardized patient assessment instrument and/or measurable assessment of functional outcome. [x] EVAL (LOW) 31267 (typically 20 minutes face-to-face)  [] EVAL (MOD) 43537 (typically 30 minutes face-to-face)  [] EVAL (HIGH) 92365 (typically 45 minutes face-to-face)  [] RE-EVAL       PLAN:   Frequency/Duration:  1-2 days per week for 6 Weeks:  Interventions:  [x]  Therapeutic exercise including: strength training, ROM, for cervical spine,scapula, core and Upper extremity, including postural re-education. [x]  NMR activation and proprioception for Deep cervical flexors, periscapular and RC muscles and Core, including postural re-education. [x]  Manual therapy as indicated for C/T spine, ribs, Soft tissue to include: Dry Needling/IASTM, STM, PROM, Gr I-IV mobilizations, manipulation. [x] Modalities as needed that may include: thermal agents, E-stim, Biofeedback, US, iontophoresis as indicated  [x] Patient education on joint protection, postural re-education, activity modification, progression of HEP. HEP instruction:      GOALS:  Patient stated goal: \"no pain\"  [] Progressing: [] Met: [] Not Met: [] Adjusted    Therapist goals for Patient:   Short Term Goals: To be achieved in: 2 weeks  1. Independent in HEP and progression per patient tolerance, in order to prevent re-injury.   [] Progressing: [] Met: [] Not Met: [] Adjusted  2. Patient will have a decrease in pain to facilitate improvement in movement, function, and ADLs as indicated by Functional Deficits.  [] Progressing: [] Met: [] Not Met: [] Adjusted    Long Term Goals: To be achieved in: 6 weeks  1. Disability index score of 7% or less for the NDI to assist with reaching prior level of function.   [] Progressing: [] Met: [] Not Met: [] Adjusted  2. Patient will demonstrate increased AROM to WFL of cervical/thoracic spine to allow for proper joint functioning as indicated by patients Functional Deficits.  [] Progressing: [] Met: [] Not Met: [] Adjusted  3. Patient will demonstrate an increase in postural awareness and control and activation of  Deep cervical stabilizers to allow for proper functional mobility as indicated by patients Functional Deficits.   [] Progressing: [] Met: [] Not Met: [] Adjusted  4. Patient will return to comb her hair with the L hand without increased symptoms or restriction.   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  [] Progressing: [] Met: [] Not Met: [] Adjusted       Electronically signed by:  Bar Smith PT , OMT-C, [id number]        Note: If patient does not return for scheduled/recommended follow up visits, this note will serve as a discharge from care along with the most recent update on progress.

## 2023-02-06 ENCOUNTER — HOSPITAL ENCOUNTER (OUTPATIENT)
Dept: PHYSICAL THERAPY | Age: 68
Setting detail: THERAPIES SERIES
Discharge: HOME | End: 2023-02-06
Payer: COMMERCIAL

## 2023-02-06 PROCEDURE — 97140 MANUAL THERAPY 1/> REGIONS: CPT

## 2023-02-06 PROCEDURE — 97110 THERAPEUTIC EXERCISES: CPT

## 2023-02-06 NOTE — FLOWSHEET NOTE
[facility] [address] [place] [address]  Phone: [phone]   Fax:     [phone]    Physical Therapy Treatment Note/ Progress Report:     Date:  2023    Patient Name:  Pauly Almeida    :  1955  MRN: [id number]    Pertinent Medical History:      Referring Provider:   DREW Fernando                            Evaluation Date: 2023                                                   Medical Diagnosis:  DDD (degenerative disc disease), cervical [M50.30]     Treatment Diagnosis:      ICD-10-CM     1. Decreased activities of daily living (ADL)  Z78.9                                      Insurance information: [facility] Choice- allowed 60/yr  Plan of care signed (Y/N): N    Date of Patient follow up with Physician:      Progress Report: []  Yes  [x]  No     Date Range for reporting period:  Beginnin2023  Ending:     Progress report due (10 Rx/or 30 days whichever is less):     Recertification due (POC duration/ or 90 days whichever is less):     Visit # Insurance/POC Allowable Auth Needed   2 12 []Yes    [x]No     Functional Outcomes Measure:     Test:NDI  Score: 23 5 raw; 10% dysfunction    Pain level:  2-8/10 L neck    History of Injury:Patient stated she has lower cervical spine and L scapular pain, L suboccipital.  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 Pain started 20 years ago and has gradually become worse. Pt took one week off of steroid and took an oral steroid and muscle relaxer 1 week ago and pain reduced. Pt has L sided headaches occasionally which \"starts with stress. \"  Pt has pain when lifting L UE overhead to lift items overhead and to fix her hair.        SUBJECTIVE:  See eval  : Not bad today, for working all day    OBJECTIVE:   Observation:   Test measurements:      CERV ROM       Cervical Flexion 35     Cervical Extension 55       Left Right   Cervical SB 25 35   Cervical rotation 48 68     UE Strength  Left Right   Shoulder Flex 4/5 4/5   Shoulder Scap       Shoulder ABd (C5 Axillary) 4/5 4/5       RESTRICTIONS/PRECAUTIONS: osteopenia    Exercises/Interventions:   Therapeutic Ex (57905)  Min: Resistance/Repetitions Notes        Chin tucks 10 x 5 sec cues   Tslide rows Yellow x10                             Manual Intervention (48734)  Min: Avoid manipulation- osteopenia                   Rib mobilizations Grade IV L first rib    STM L neck L scalenes/lev scap         NMR re-education (48930)  Min:               Therapeutic Activity (90500)  Min:               Modalities  Min:                  Other Therapeutic Activities:  Pt was educated on PT POC, Diagnosis, Prognosis, pathomechanics as well as frequency and duration of scheduling future physical therapy appointments. Time was also taken on this day to answer all patient questions and participation in PT. Reviewed appointment policy in detail with patient and patient verbalized understanding. Home Exercise Program:  Access Code: [email or web address]  URL: [email or web address]  Date: 02/01/2023  Prepared by: Justina Hyde    Exercises  Supine Chin Tuck - 1 x daily - 7 x weekly - 10 reps - 5 hold  Standing Shoulder Shrugs - 1 x daily - 7 x weekly - 10 reps - 5 hold  Standing Scapular Retraction - 1 x daily - 7 x weekly - 10 reps - 5 hold  Standing Shoulder Posterior Capsule Stretch - 1 x daily - 7 x weekly - 1 reps - 30 hold  Doorway Pec Stretch at 90 Degrees Abduction - 1 x daily - 7 x weekly - 3 reps - 15 hold      Therapeutic Exercise and NMR EXR  [] (00672) Provided verbal/tactile cueing for activities related to strengthening, flexibility, endurance, ROM  for improvements in cervical, postural, scapular, scapulothoracic and UE control with self care, reaching, carrying, lifting, house/yardwork, driving/computer work.    [] (60986) Provided verbal/tactile cueing for activities related to improving balance, coordination, kinesthetic sense, posture, motor skill, proprioception  to assist with cervical, scapular, scapulothoracic and UE control with self care, reaching, carrying, lifting, house/yardwork, driving/computer work. Therapeutic Activities:    [] (82295 or 93981) Provided verbal/tactile cueing for activities related to improving balance, coordination, kinesthetic sense, posture, motor skill, proprioception and motor activation to allow for proper function of cervical, scapular, scapulothoracic and UE control with self care, carrying, lifting, driving/computer work.      Home Exercise Program:    [] (81604) Reviewed/Progressed HEP activities related to strengthening, flexibility, endurance, ROM of cervical, scapular, scapulothoracic and UE control with self care, reaching, carrying, lifting, house/yardwork, driving/computer work  [] (76348) Reviewed/Progressed HEP activities related to improving balance, coordination, kinesthetic sense, posture, motor skill, proprioception of cervical, scapular, scapulothoracic and UE control with self care, reaching, carrying, lifting, house/yardwork, driving/computer work      Manual Treatments:  PROM / STM / Oscillations-Mobs:  G-I, II, III, IV (PA's, Inf., Post.)  [] (32600) Provided manual therapy to mobilize soft tissue/joints of cervical/CT, scapular GHJ and UE for the purpose of decreasing headache, modulating pain, promoting relaxation,  increasing ROM, reducing/eliminating soft tissue swelling/inflammation/restriction, improving soft tissue extensibility and allowing for proper ROM for normal function with self care, reaching, carrying, lifting, house/yardwork, driving/computer work      Charges:  Timed Code Treatment Minutes: 25   Total Treatment Minutes: 45     [] EVAL (LOW) 10910 (typically 20 minutes face-to-face)  [] EVAL (MOD) 81708 (typically 30 minutes face-to-face)  [] EVAL (HIGH) 03664 (typically 45 minutes face-to-face)  [] RE-EVAL     [x] CA(55417) x     [] Dry needle 1 or 2 Muscles (30352)  [] NMR (56883) x     [] Dry needle 3+ Muscles (44948)  [x] Manual (32958) x     [] Ultrasound (05412) x  [] TA (20295) x     [] Mech Traction (15076)  [] ES(attended) (89518)     [] ES (un) (97278):   [] Vasopump (56417) [] Ionto (90403)   [] Other:    GOALS:  Patient stated goal: \"no pain\"  [] Progressing: [] Met: [] Not Met: [] Adjusted     Therapist goals for Patient:   Short Term Goals: To be achieved in: 2 weeks  1. Independent in HEP and progression per patient tolerance, in order to prevent re-injury. [] Progressing: [] Met: [] Not Met: [] Adjusted  2. Patient will have a decrease in pain to facilitate improvement in movement, function, and ADLs as indicated by Functional Deficits. [] Progressing: [] Met: [] Not Met: [] Adjusted     Long Term Goals: To be achieved in: 6 weeks  1. Disability index score of 7% or less for the NDI to assist with reaching prior level of function. [] Progressing: [] Met: [] Not Met: [] Adjusted  2. Patient will demonstrate increased AROM to [facility] of cervical/thoracic spine to allow for proper joint functioning as indicated by patients Functional Deficits. [] Progressing: [] Met: [] Not Met: [] Adjusted  3. Patient will demonstrate an increase in postural awareness and control and activation of  Deep cervical stabilizers to allow for proper functional mobility as indicated by patients Functional Deficits. [] Progressing: [] Met: [] Not Met: [] Adjusted  4. Patient will return to comb her hair with the L hand without increased symptoms or restriction. 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   [] Progressing: [] Met: [] Not Met: [] Adjusted         ASSESSMENT:  See eval    Treatment/Activity Tolerance:  [x] Patient tolerated treatment well [] Patient limited by urszula  [] Patient limited by pain  [] Patient limited by other medical complications  [] Other:     Overall Progression Towards Functional goals/ Treatment Progress Update:  [] Patient is progressing as expected towards functional goals listed. [] Progression is slowed due to complexities/Impairments listed. [] Progression has been slowed due to co-morbidities. [x] Plan just implemented, too soon to assess goals progression <30days   [] Goals require adjustment due to lack of progress  [] Patient is not progressing as expected and requires additional follow up with physician  [] Other    Prognosis for POC: [x] Good [] Fair  [] Poor    Patient requires continued skilled intervention: [x] Yes  [] No        PLAN: See eval. Begin t-slide scapular exercises and chin tuck progression  [] Continue per plan of care [] Alter current plan (see comments)  [x] Plan of care initiated [] Hold pending MD visit [] Discharge    Electronically signed by: Michele Peoples, PTA [id number]      Note: If patient does not return for scheduled/recommended follow up visits, this note will serve as a discharge from care along with the most recent update on progress.

## 2023-02-09 ENCOUNTER — HOSPITAL ENCOUNTER (OUTPATIENT)
Dept: PHYSICAL THERAPY | Age: 68
Setting detail: THERAPIES SERIES
Discharge: HOME | End: 2023-02-09
Payer: COMMERCIAL

## 2023-02-09 PROCEDURE — 97140 MANUAL THERAPY 1/> REGIONS: CPT

## 2023-02-09 PROCEDURE — 97112 NEUROMUSCULAR REEDUCATION: CPT

## 2023-02-09 PROCEDURE — 97110 THERAPEUTIC EXERCISES: CPT

## 2023-02-09 NOTE — FLOWSHEET NOTE
[facility] [address] [address]  Phone: [phone]   Fax:     [phone]    Physical Therapy Treatment Note/ Progress Report:     Date:  2023    Patient Name:  Jm Blanco    :  1955  MRN: [id number]    Pertinent Medical History:      Referring Provider:   DREW Benavidez                            Evaluation Date: 2023                                                   Medical Diagnosis:  DDD (degenerative disc disease), cervical [M50.30]     Treatment Diagnosis:      ICD-10-CM     1. Decreased activities of daily living (ADL)  Z78.9                                      Insurance information: [facility] Choice- allowed 60/yr  Plan of care signed (Y/N): N    Date of Patient follow up with Physician:      Progress Report: []  Yes  [x]  No     Date Range for reporting period:  Beginnin2023  Ending:     Progress report due (10 Rx/or 30 days whichever is less):     Recertification due (POC duration/ or 90 days whichever is less):     Visit # Insurance/POC Allowable Auth Needed   3 12 []Yes    [x]No     Functional Outcomes Measure:     Test:NDI  Score: 23 5 raw; 10% dysfunction    Pain level:  2-8/10 L neck    History of Injury:Patient stated she has lower cervical spine and L scapular pain, L suboccipital.  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 Pain started 20 years ago and has gradually become worse. Pt took one week off of steroid and took an oral steroid and muscle relaxer 1 week ago and pain reduced. Pt has L sided headaches occasionally which \"starts with stress. \"  Pt has pain when lifting L UE overhead to lift items overhead and to fix her hair. SUBJECTIVE:  See eval  23 L neck pain is rated 2/10 currently; pt stated therapy has been \"fine. \"  Pt stated \"I think the exercises have helped me.\"  Pt stated it is easier to look over the left shoulder and has no pain at work. OBJECTIVE:   Observation:   Test measurements:      CERV ROM         Cervical Flexion 35       Cervical Extension 55         Left Right L 2/9/23 R 2/9/23   Cervical SB 25 35     Cervical rotation 48 68 70 72     UE Strength  Left Right   Shoulder Flex 4/5 4/5   Shoulder Scap       Shoulder ABd (C5 Axillary) 4/5 4/5       RESTRICTIONS/PRECAUTIONS: osteopenia    Exercises/Interventions:   Therapeutic Ex (58789)  Min: Resistance/Repetitions Notes             T-slide  Rows  Lat pulls  Reverse butterfly   Green 20x  Green 20x  Pink 2x10    Quadruped chin tuck add         Reverse corner pressouts   2x10    Pec door stretch 3x15\"         Manual Intervention (19609)  Min: Avoid manipulation- osteopenia                   Rib mobilizations Grade IV L first rib    STM L neck L scalenes/lev scap         NMR re-education (63421)  Min:     Prone lats  Prone mid trap  Prone low trap 10x  10x  5x    Isometric SB neck L/R 10x each    Chin tuck with neck flexion 10x         Therapeutic Activity (56403)  Min:               Modalities  Min:                  Other Therapeutic Activities:  Pt was educated on PT POC, Diagnosis, Prognosis, pathomechanics as well as frequency and duration of scheduling future physical therapy appointments. Time was also taken on this day to answer all patient questions and participation in PT. Reviewed appointment policy in detail with patient and patient verbalized understanding. Home Exercise Program:  Access Code: [email or web address]  URL: [email or web address]  Date: 02/01/2023  Prepared by: Wyatt Jump    Exercises  Supine Chin Tuck - 1 x daily - 7 x weekly - 10 reps - 5 hold  Standing Shoulder Shrugs - 1 x daily - 7 x weekly - 10 reps - 5 hold  Standing Scapular Retraction - 1 x daily - 7 x weekly - 10 reps - 5 hold  Standing Shoulder Posterior Capsule Stretch - 1 x daily - 7 x weekly - 1 reps - 30 hold  Doorway Pec Stretch at 90 Degrees Abduction - 1 x daily - 7 x weekly - 3 reps - 15 hold      Therapeutic Exercise and NMR EXR  [] (65054) Provided verbal/tactile cueing for activities related to strengthening, flexibility, endurance, ROM  for improvements in cervical, postural, scapular, scapulothoracic and UE control with self care, reaching, carrying, lifting, house/yardwork, driving/computer work.    [] (63249) Provided verbal/tactile cueing for activities related to improving balance, coordination, kinesthetic sense, posture, motor skill, proprioception  to assist with cervical, scapular, scapulothoracic and UE control with self care, reaching, carrying, lifting, house/yardwork, driving/computer work. Therapeutic Activities:    [] (77962 or 19366) Provided verbal/tactile cueing for activities related to improving balance, coordination, kinesthetic sense, posture, motor skill, proprioception and motor activation to allow for proper function of cervical, scapular, scapulothoracic and UE control with self care, carrying, lifting, driving/computer work.      Home Exercise Program:    [] (47464) Reviewed/Progressed HEP activities related to strengthening, flexibility, endurance, ROM of cervical, scapular, scapulothoracic and UE control with self care, reaching, carrying, lifting, house/yardwork, driving/computer work  [] (79875) Reviewed/Progressed HEP activities related to improving balance, coordination, kinesthetic sense, posture, motor skill, proprioception of cervical, scapular, scapulothoracic and UE control with self care, reaching, carrying, lifting, house/yardwork, driving/computer work      Manual Treatments:  PROM / STM / Oscillations-Mobs:  G-I, II, III, IV (PA's, Inf., Post.)  [] (08031) Provided manual therapy to mobilize soft tissue/joints of cervical/CT, scapular GHJ and UE for the purpose of decreasing headache, modulating pain, promoting relaxation,  increasing ROM, reducing/eliminating soft tissue swelling/inflammation/restriction, improving soft tissue extensibility and allowing for proper ROM for normal function with self care, reaching, carrying, lifting, house/yardwork, driving/computer work      Charges:  Timed Code Treatment Minutes: 45   Total Treatment Minutes: 45     [] EVAL (LOW) 06408 (typically 20 minutes face-to-face)  [] EVAL (MOD) 85482 (typically 30 minutes face-to-face)  [] EVAL (HIGH) 09648 (typically 45 minutes face-to-face)  [] RE-EVAL     [x] PZ(12731) x     [] Dry needle 1 or 2 Muscles (17397)  [x] NMR (64624) x     [] Dry needle 3+ Muscles (72238)  [x] Manual (50943) x     [] Ultrasound (88576) x  [] TA (56993) x     [] Mech Traction (18201)  [] ES(attended) (53498)     [] ES (un) (22 158983):   [] Vasopump (00150) [] Ionto (23615)   [] Other:    GOALS:  Patient stated goal: \"no pain\"  [] Progressing: [] Met: [] Not Met: [] Adjusted     Therapist goals for Patient:   Short Term Goals: To be achieved in: 2 weeks  1. Independent in HEP and progression per patient tolerance, in order to prevent re-injury. [] Progressing: [] Met: [] Not Met: [] Adjusted  2. Patient will have a decrease in pain to facilitate improvement in movement, function, and ADLs as indicated by Functional Deficits. [] Progressing: [] Met: [] Not Met: [] Adjusted     Long Term Goals: To be achieved in: 6 weeks  1. Disability index score of 7% or less for the NDI to assist with reaching prior level of function. [] Progressing: [] Met: [] Not Met: [] Adjusted  2. Patient will demonstrate increased AROM to [facility] of cervical/thoracic spine to allow for proper joint functioning as indicated by patients Functional Deficits. [] Progressing: [] Met: [] Not Met: [] Adjusted  3. Patient will demonstrate an increase in postural awareness and control and activation of  Deep cervical stabilizers to allow for proper functional mobility as indicated by patients Functional Deficits. [] Progressing: [] Met: [] Not Met: [] Adjusted  4. Patient will return to comb her hair with the L hand without increased symptoms or restriction. 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 [] Progressing: [] Met: [] Not Met: [] Adjusted         ASSESSMENT:  See eval    Treatment/Activity Tolerance:  [x] Patient tolerated treatment well [] Patient limited by fatique  [] Patient limited by pain  [] Patient limited by other medical complications  [] Other:     Overall Progression Towards Functional goals/ Treatment Progress Update:  [] Patient is progressing as expected towards functional goals listed. [] Progression is slowed due to complexities/Impairments listed. [] Progression has been slowed due to co-morbidities. [x] Plan just implemented, too soon to assess goals progression <30days   [] Goals require adjustment due to lack of progress  [] Patient is not progressing as expected and requires additional follow up with physician  [] Other    Prognosis for POC: [x] Good [] Fair  [] Poor    Patient requires continued skilled intervention: [x] Yes  [] No        PLAN: See eval. Begin t-slide scapular exercises and chin tuck progression  [] Continue per plan of care [] Alter current plan (see comments)  [x] Plan of care initiated [] Hold pending MD visit [] Discharge    Electronically signed by: Gill Thakkar PT , OMT-C, [id number]      Note: If patient does not return for scheduled/recommended follow up visits, this note will serve as a discharge from care along with the most recent update on progress.

## 2023-02-10 ENCOUNTER — OFFICE VISIT (OUTPATIENT)
Dept: BREAST CENTER | Age: 68
End: 2023-02-10

## 2023-02-10 ENCOUNTER — HOSPITAL ENCOUNTER (OUTPATIENT)
Dept: WOMENS IMAGING | Age: 68
Discharge: HOME | End: 2023-02-10
Payer: COMMERCIAL

## 2023-02-10 VITALS
WEIGHT: 187 LBS | BODY MASS INDEX: 30.05 KG/M2 | HEART RATE: 79 BPM | HEIGHT: 66 IN | RESPIRATION RATE: 18 BRPM | OXYGEN SATURATION: 98 %

## 2023-02-10 DIAGNOSIS — Z12.31 ENCOUNTER FOR SCREENING MAMMOGRAM FOR BREAST CANCER: Primary | ICD-10-CM

## 2023-02-10 DIAGNOSIS — Z12.31 ENCOUNTER FOR SCREENING MAMMOGRAM FOR BREAST CANCER: ICD-10-CM

## 2023-02-10 DIAGNOSIS — Z12.39 ENCOUNTER FOR SCREENING BREAST EXAMINATION: Primary | ICD-10-CM

## 2023-02-10 DIAGNOSIS — Z12.31 BREAST CANCER SCREENING BY MAMMOGRAM: ICD-10-CM

## 2023-02-10 DIAGNOSIS — Z80.3 FAMILY HISTORY OF BREAST CANCER: ICD-10-CM

## 2023-02-10 PROCEDURE — 77063 BREAST TOMOSYNTHESIS BI: CPT

## 2023-02-10 NOTE — PROGRESS NOTES
[facility]   Surgical Breast Oncology     Primary Care Provider: EDUARDO Astudillo NP    CC: Breast Abscess, Left    HPI: Ms. Jt Da Silva is a 79 y.o. woman who presents today for annual follow up for breast check and mammogram results. Overall doing well and has no breast related concerns. She states that she does perform routine self breast evaluations and has not noticed any new abnormalities such as masses, skin changes, color changes, nipple discharge, or changes to the nipple-areolar complex. She has never required a breast biopsy. She has a history or left breast epidermoid cyst flare. She has a family history of breast cancer in her mother, sister, and niece. Diet: regular, tries to eat a healthy diet and avoids carbs and fatty foods  Alcohol: rarely  Exercise: walks 10,000+ steps daily  Sleep: about 8 hours a night  Caffeine: has one cup of coffee every am  Going to PT for neck pain, drives a fork lift at work    INTERVAL HISTORY:  Bilateral screening mammogram 3/4/2021:  No concerning findings suggestive malignancy. BI-RADS 1. Bilateral diagnostic mammogram and left breast ultrasound 1/13/2022:  Patient's palpable finding corresponds to a 0.4 x 0.2 cm subcutaneous  lesion at 7 o'clock, left cm from left nipple. This may represent an  infected subcutaneous inflammatory lesion  or a sebaceous cyst.  Overlying focal skin redness/lesion noted. According to patient she has had this for 1-1/2 weeks. Clinical follow-up advised. 2. No mammographic evidence of malignancy. BI-RADS 2    Bilateral screening mammogram 2/10/2023:  No new concerning findings suggestive of malignancy. BI-RADS1. Review of Systems    Past Medical History:   Diagnosis Date    Allergic rhinitis     saw allergist, dr Keyana Lyles of years. had allergy shots in the past    Breast mass in female 1/6/2022    FH: breast cancer 6/1/2021    6.2021 breast mom and sister.     History of adenomatous polyp of colon 2017    Hyperlipidemia LDL goal < 130     Hypertension     Osteopenia     Osteoporosis     Prediabetes        Past Surgical History:   Procedure Laterality Date    WISDOM TOOTH EXTRACTION         Allergies as of 02/10/2023    (No Known Allergies)       Social History     Tobacco Use    Smoking status: Never    Smokeless tobacco: Never   Vaping Use    Vaping Use: Never used   Substance Use Topics    Alcohol use: Not Currently     Comment: rarely    Drug use: Not Currently       Menstrual History:  Menarche age: 12  . Age first live birth: 23  Breastfeed: no  Postmenopausal  Natural menopause     Oral contraceptive use: yes for about 15 years and is not still using  Hormone replacement therapy use: no      Breast density: Heterogeneously dense   [employer]: no   Genetic testing: the patient has not but her sister João Friends) has and was negative     Family history significant for breast and ovarian cancer: Mother Breast Cancer, dx [de-identified]  at 80  Sister João Friends) Breast Cancer, age of dx 70  Niece (Oleg Brooks, daughter of Graciela Norton) Breast Cancer, age of dx 36  at 37  Niece had a tumor on her neck at age 15 then Lung Cancer at 23  at 25  Brother Oly Lao 25, age of dx ???     Lifetime risk for breast cancer 16.4%    [unfilled]  Medication documentation has been reviewed in the electronic medical record and patient office intake form. REVIEW OF SYSTEMS:  Review of Systems   Constitutional: Negative for unexpected weight change. Eyes: Negative for visual disturbance. Respiratory: Negative for cough and shortness of breath. Cardiovascular: Negative for chest pain and palpitations. Gastrointestinal: Negative for abdominal pain. Musculoskeletal: Negative for arthralgias and myalgias. Neurological: Negative for headaches. Hematological: Negative for adenopathy. Does not bruise/bleed easily. Psychiatric/Behavioral: Negative for dysphoric mood.  The patient is not nervous/anxious. EXAM:  Pulse 79   Resp 18   Ht 5' 6\" (1.676 m)   Wt 187 lb (84.8 kg)   LMP 10/24/2003   SpO2 98%   BMI 30.18 kg/m²   Physical Exam  Constitutional: She appearswell-nourished. No apparent distress. Breast: The patient was examined in the upright and supine position. Breasts are symmetrically ptotic. Right: No new masses or changes in breast contour. No skin changes of the breast or nipple areolar complex. No nipple inversion or discharge. No erythema, thickening (peau d'orange), or dimpling. Left: Slight purple discoloration and firmness at 6:00 superior to IMF. No other masses or changes in breast contour. No skin changes of the breast or nipple areolar complex. No nipple inversion or discharge. No skin thickening (peau d'orange), or dimpling. There is no axillary lymphadenopathy palpated bilaterally. Head: Normocephalic and atraumatic:   Eyes: EOM are normal. Pupils are equal, round, and reactive to light. Neck: Neck supple. No tracheal deviation present. No obvious mass. Lymphatics: No palpable supraclavicular, cervical, or axillary lymphadenopathy  Skin: No rash noted. No erythema. Neurologic: alert and oriented. Extremities: appear well perfused. Risk assessment using [facility] Breast Cancer Risk Evaluation Tool to evaluate her risk compared to the general population. Her lifetime risk for breast cancer is 16.4% (general population average 7%). 20% or greater is considered to be high risk for breast cancer. ASSESSMENT:   Encounter for screening breast exam - no concerning findings on clinical breast exam.  No concerning finding suggestive of malignancy on breast imaging. History of Left Breast Skin lesion - probable inflamed epidermoid cyst with recurrence based on clinical exam findings and recent breast imaging findings. Improved and inflammation resolved. Breast Pain - secondary to above. Resolved.    Family history of breast cancer-mother, sister, niece     PLAN:   Continue annual screening mammography with tomosynthesis  Continue annual clinical breast exams   Discussed the importance of breast awareness including the importance and technique of self breast exams  Today's imaging was reviewed, documented above, results were discussed with the patient, all questions answered  Education provided for Healthy Lifestyle Recommendations: healthy diet, routine exercise, weight control, decreased alcohol consumption. Follow up after annual mammogram in 1 year          All of the patient's questions were answered at this time however, she was encouraged to call the office with any further inquiries. Approximately 20 minutes of time were spent in preparation, direct patient contact, counseling, care coordination, documentation and activities otherwise related to this encounter.           EDUARDO Barrientos-CNP  [facility])   Surgical Breast Oncology   [phone]

## 2023-02-10 NOTE — PATIENT INSTRUCTIONS
Healthy Lifestyle Recommendations: healthy diet (decrease consumption of red meat, increase fresh fruits and vegetables), decreased alcohol consumption (less than 4 drinks/week), adequate sleep (goal 6-8 hours), routine exercise (goal 150 minutes/week or greater), weight control. Patient Education        Breast Self-Exam: Care Instructions  Your Care Instructions     A breast self-exam is when you check your breasts for lumps or changes. This regular exam helps you learn how your breasts normally look and feel. Most breast problems or changes are not because of cancer. Breast self-exam is not a substitute for a mammogram. Having regular breast exams by your doctor and regular mammograms improve your chances of finding any problems with your breasts. Some women set a time each month to do a step-by-step breast self-exam. Other women like a less formal system. They might look at their breasts as they brush their teeth, or feel their breasts once in a while in the shower. If you notice a change in your breast, tell your doctor. Follow-up care is a key part of your treatment and safety. Be sure to make and go to all appointments, and call your doctor if you are having problems. It's also a good idea to know your test results and keep a list of the medicines you take. How do you do a breast self-exam?  The best time to examine your breasts is usually one week after your menstrual period begins. Your breasts should not be tender then. If you do not have periods, you might do your exam on a day of the month that is easy to remember. To examine your breasts:  Remove all your clothes above the waist and lie down. When you are lying down, your breast tissue spreads evenly over your chest wall, which makes it easier to feel all your breast tissue. Use the pads--not the fingertips--of the 3 middle fingers of your left hand to check your right breast. Move your fingers slowly in small coin-sized circles that overlap.   Use three levels of pressure to feel of all your breast tissue. Use light pressure to feel the tissue close to the skin surface. Use medium pressure to feel a little deeper. Use firm pressure to feel your tissue close to your breastbone and ribs. Use each pressure level to feel your breast tissue before moving on to the next spot. Check your entire breast, moving up and down as if following a strip from the collarbone to the bra line, and from the armpit to the ribs. Repeat until you have covered the entire breast.  Repeat this procedure for your left breast, using the pads of the 3 middle fingers of your right hand. To examine your breasts while in the shower:  Place one arm over your head and lightly soap your breast on that side. Using the pads of your fingers, gently move your hand over your breast (in the strip pattern described above), feeling carefully for any lumps or changes. Repeat for the other breast.  Have your doctor inspect anything you notice to see if you need further testing. Where can you learn more? Go to http://www.woods.com/ and enter P148 to learn more about \"Breast Self-Exam: Care Instructions. \"  Current as of: August 2, 2022               Content Version: 13.5  © 2006-2022 [employer]. Care instructions adapted under license by [facility] ([facility]). If you have questions about a medical condition or this instruction, always ask your healthcare professional. [place] [address] any warranty or liability for your use of this information.

## 2023-02-14 ENCOUNTER — HOSPITAL ENCOUNTER (OUTPATIENT)
Dept: PHYSICAL THERAPY | Age: 68
Setting detail: THERAPIES SERIES
Discharge: HOME | End: 2023-02-14
Payer: COMMERCIAL

## 2023-02-14 PROCEDURE — 97110 THERAPEUTIC EXERCISES: CPT

## 2023-02-14 PROCEDURE — 97140 MANUAL THERAPY 1/> REGIONS: CPT

## 2023-02-14 PROCEDURE — 97112 NEUROMUSCULAR REEDUCATION: CPT

## 2023-02-14 NOTE — FLOWSHEET NOTE
[facility] [address] [address]  Phone: [phone]   Fax:     [phone]    Physical Therapy Treatment Note/ Progress Report:     Date:  2023    Patient Name:  Patricia Mariano    :  1955  MRN: [id number]    Pertinent Medical History:      Referring Provider:   DREW Lindo                            Evaluation Date: 2023                                                   Medical Diagnosis:  DDD (degenerative disc disease), cervical [M50.30]     Treatment Diagnosis:      ICD-10-CM     1. Decreased activities of daily living (ADL)  Z78.9                                      Insurance information: [facility] Choice- allowed 60/yr  Plan of care signed (Y/N): Y    Date of Patient follow up with Physician:      Progress Report: []  Yes  [x]  No     Date Range for reporting period:  Beginnin2023  Ending:     Progress report due (10 Rx/or 30 days whichever is less):     Recertification due (POC duration/ or 90 days whichever is less):     Visit # Insurance/POC Allowable Auth Needed   4 12 []Yes    [x]No     Functional Outcomes Measure:     Test:NDI  Score: 23 5 raw; 10% dysfunction; 23 8 raw, 16% dysfunction    Pain level:  2-8/10 L neck    History of Injury:Patient stated she has lower cervical spine and L scapular pain, L suboccipital.  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 Pain started 20 years ago and has gradually become worse. Pt took one week off of steroid and took an oral steroid and muscle relaxer 1 week ago and pain reduced. Pt has L sided headaches occasionally which \"starts with stress. \"  Pt has pain when lifting L UE overhead to lift items overhead and to fix her hair. SUBJECTIVE:  See eval  23 L neck pain is rated 2/10 currently; pt stated therapy has been \"fine. \"  Pt stated \"I think the exercises have helped me. \"  Pt stated it is easier to look over the left shoulder and has no pain at work. 2/14/23 pt stated \"it feels better. \"  Pt has no neck pain but \"I still have pain between the shoulder blades. \"    OBJECTIVE:   Observation:   Test measurements:      CERV ROM 2/1/23        Cervical Flexion 35       Cervical Extension 55         Left Right L 2/9/23 R 2/9/23   Cervical SB 25 35     Cervical rotation 48 68 70 72     UE Strength  Left  2/1/23 Right  2/1/23   Shoulder Flex 4/5 4/5   Shoulder ABd (C5 Axillary) 4/5 4/5       RESTRICTIONS/PRECAUTIONS: osteopenia    Exercises/Interventions:   Therapeutic Ex (69998)  Min: Resistance/Repetitions Notes             T-slide  Rows  Lat pulls  Reverse butterfly  ABD   Green 30x  Green 30x  Pink 2x10  Yellow 2x10 L/R    Quadruped chin tuck add         Reverse corner pressouts   2x10    Pec door stretch 3x15\"         Manual Intervention ([phone])  Min: Avoid manipulation- osteopenia                   Rib mobilizations Grade IV L first rib    STM L neck L scalenes/lev scap         NMR re-education (99404)  Min:     Prone lats  Prone mid trap  Prone low trap 10x  10x  5x    Isometric SB neck L/R 10x each    Chin tuck with neck flexion 10x         Therapeutic Activity (53898)  Min:               Modalities  Min:                  Other Therapeutic Activities:  Pt was educated on PT POC, Diagnosis, Prognosis, pathomechanics as well as frequency and duration of scheduling future physical therapy appointments. Time was also taken on this day to answer all patient questions and participation in PT. Reviewed appointment policy in detail with patient and patient verbalized understanding. Home Exercise Program:  Access Code: [email or web address]  URL: [email or web address]  Date: 02/14/2023  Prepared by: Nitza Vazquez    Exercises  Supine Chin Tuck - 1 x daily - 7 x weekly - 10 reps - 5 hold  Standing Shoulder Shrugs - 1 x daily - 7 x weekly - 10 reps - 5 hold  Standing Scapular Retraction - 1 x daily - 7 x weekly - 10 reps - 5 hold  Standing Shoulder Posterior Capsule Stretch - 1 x daily - 7 x weekly - 1 reps - 30 hold  Doorway Pec Stretch at 90 Degrees Abduction - 1 x daily - 7 x weekly - 3 reps - 15 hold  Prone Scapular Slide with Shoulder Extension - 1 x daily - 7 x weekly - 10 reps - 5 hold  Prone Scapular Retraction Arms at Side - 1 x daily - 7 x weekly - 10 reps - 5 hold  Quadruped Cervical Retraction - 1 x daily - 7 x weekly - 10 reps - 5 hold        Therapeutic Exercise and NMR EXR  [] (05975) Provided verbal/tactile cueing for activities related to strengthening, flexibility, endurance, ROM  for improvements in cervical, postural, scapular, scapulothoracic and UE control with self care, reaching, carrying, lifting, house/yardwork, driving/computer work.    [] (95266) Provided verbal/tactile cueing for activities related to improving balance, coordination, kinesthetic sense, posture, motor skill, proprioception  to assist with cervical, scapular, scapulothoracic and UE control with self care, reaching, carrying, lifting, house/yardwork, driving/computer work. Therapeutic Activities:    [] (72877 or 07471) Provided verbal/tactile cueing for activities related to improving balance, coordination, kinesthetic sense, posture, motor skill, proprioception and motor activation to allow for proper function of cervical, scapular, scapulothoracic and UE control with self care, carrying, lifting, driving/computer work.      Home Exercise Program:    [] (06625) Reviewed/Progressed HEP activities related to strengthening, flexibility, endurance, ROM of cervical, scapular, scapulothoracic and UE control with self care, reaching, carrying, lifting, house/yardwork, driving/computer work  [] (48442) Reviewed/Progressed HEP activities related to improving balance, coordination, kinesthetic sense, posture, motor skill, proprioception of cervical, scapular, scapulothoracic and UE control with self care, reaching, carrying, lifting, house/yardwork, driving/computer work      Manual Treatments:  PROM / STM / Oscillations-Mobs:  G-I, II, III, IV (Valentino, Inf., Post.)  [] (42586) Provided manual therapy to mobilize soft tissue/joints of cervical/CT, scapular GHJ and UE for the purpose of decreasing headache, modulating pain, promoting relaxation,  increasing ROM, reducing/eliminating soft tissue swelling/inflammation/restriction, improving soft tissue extensibility and allowing for proper ROM for normal function with self care, reaching, carrying, lifting, house/yardwork, driving/computer work      Charges:  Timed Code Treatment Minutes: 45   Total Treatment Minutes: 45     [] EVAL (LOW) 65298 (typically 20 minutes face-to-face)  [] EVAL (MOD) 85257 (typically 30 minutes face-to-face)  [] EVAL (HIGH) 70282 (typically 45 minutes face-to-face)  [] RE-EVAL     [x] BD(05819) x     [] Dry needle 1 or 2 Muscles (43186)  [x] NMR (04600) x     [] Dry needle 3+ Muscles (77438)  [x] Manual (77190) x     [] Ultrasound (60629) x  [] TA (30207) x     [] Mech Traction (67166)  [] ES(attended) (20720)     [] ES (un) (22642):   [] Vasopump (90191) [] Ionto (61164)   [] Other:    GOALS:  Patient stated goal: \"no pain\"  [] Progressing: [] Met: [] Not Met: [] Adjusted     Therapist goals for Patient:   Short Term Goals: To be achieved in: 2 weeks  1. Independent in HEP and progression per patient tolerance, in order to prevent re-injury. [] Progressing: [] Met: [] Not Met: [] Adjusted  2. Patient will have a decrease in pain to facilitate improvement in movement, function, and ADLs as indicated by Functional Deficits. [] Progressing: [] Met: [] Not Met: [] Adjusted     Long Term Goals: To be achieved in: 6 weeks  1. Disability index score of 7% or less for the NDI to assist with reaching prior level of function. [] Progressing: [] Met: [] Not Met: [] Adjusted  2.  Patient will demonstrate increased AROM to WFL of cervical/thoracic spine to allow for proper joint functioning as indicated by patients Functional Deficits. [] Progressing: [] Met: [] Not Met: [] Adjusted  3. Patient will demonstrate an increase in postural awareness and control and activation of  Deep cervical stabilizers to allow for proper functional mobility as indicated by patients Functional Deficits. [] Progressing: [] Met: [] Not Met: [] Adjusted  4. Patient will return to comb her hair with the L hand without increased symptoms or restriction. 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 [] Progressing: [] Met: [] Not Met: [] Adjusted         ASSESSMENT:  See eval    Treatment/Activity Tolerance:  [x] Patient tolerated treatment well [] Patient limited by fatique  [] Patient limited by pain  [] Patient limited by other medical complications  [] Other:     Overall Progression Towards Functional goals/ Treatment Progress Update:  [] Patient is progressing as expected towards functional goals listed. [] Progression is slowed due to complexities/Impairments listed. [] Progression has been slowed due to co-morbidities.   [x] Plan just implemented, too soon to assess goals progression <30days   [] Goals require adjustment due to lack of progress  [] Patient is not progressing as expected and requires additional follow up with physician  [] Other    Prognosis for POC: [x] Good [] Fair  [] Poor    Patient requires continued skilled intervention: [x] Yes  [] No        PLAN: See eval. Begin t-slide scapular exercises and chin tuck progression  [] Continue per plan of care [] Alter current plan (see comments)  [x] Plan of care initiated [] Hold pending MD visit [] Discharge    Electronically signed by: Markel Luong, PT , OMT-C, [id number]      Note: If patient does not return for scheduled/recommended follow up visits, this note will serve as a discharge from care along with the most recent update on progress.

## 2023-02-16 ENCOUNTER — HOSPITAL ENCOUNTER (OUTPATIENT)
Dept: PHYSICAL THERAPY | Age: 68
Setting detail: THERAPIES SERIES
Discharge: HOME | End: 2023-02-16
Payer: COMMERCIAL

## 2023-02-16 PROCEDURE — 97112 NEUROMUSCULAR REEDUCATION: CPT

## 2023-02-16 PROCEDURE — 97110 THERAPEUTIC EXERCISES: CPT

## 2023-02-16 PROCEDURE — 97140 MANUAL THERAPY 1/> REGIONS: CPT

## 2023-02-16 NOTE — FLOWSHEET NOTE
[facility] [address] [place] [address]  Phone: [phone]   Fax:     [phone]    Physical Therapy Treatment Note/ Progress Report:     Date:  2023    Patient Name:  Angie Paredes    :  1955  MRN: [id number]    Pertinent Medical History:      Referring Provider:   DREW Baugh                            Evaluation Date: 2023                                                   Medical Diagnosis:  DDD (degenerative disc disease), cervical [M50.30]     Treatment Diagnosis:      ICD-10-CM     1. Decreased activities of daily living (ADL)  Z78.9                                      Insurance information: [facility] Choice- allowed 60/yr  Plan of care signed (Y/N): Y    Date of Patient follow up with Physician:      Progress Report: []  Yes  [x]  No     Date Range for reporting period:  Beginnin2023  Ending:     Progress report due (10 Rx/or 30 days whichever is less):     Recertification due (POC duration/ or 90 days whichever is less):     Visit # Insurance/POC Allowable Auth Needed   5 12 []Yes    [x]No     Functional Outcomes Measure:     Test:NDI  Score: 23 5 raw; 10% dysfunction; 23 8 raw, 16% dysfunction    Pain level:  2-8/10 L neck    History of Injury:Patient stated she has lower cervical spine and L scapular pain, L suboccipital.  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 Pain started 20 years ago and has gradually become worse. Pt took one week off of steroid and took an oral steroid and muscle relaxer 1 week ago and pain reduced. Pt has L sided headaches occasionally which \"starts with stress. \"  Pt has pain when lifting L UE overhead to lift items overhead and to fix her hair. SUBJECTIVE:  See eval  23 L neck pain is rated 2/10 currently; pt stated therapy has been \"fine. \"  Pt stated \"I think the exercises have helped me. \"  Pt stated it is easier to look over the left shoulder and has no pain at work. 2/14/23 pt stated \"it feels better. \"  Pt has no neck pain but \"I still have pain between the shoulder blades. \"  2/16/23     OBJECTIVE:   Observation:   Test measurements:      CERV ROM 2/1/23        Cervical Flexion 35       Cervical Extension 55         Left Right L 2/9/23 R 2/9/23   Cervical SB 25 35     Cervical rotation 48 68 70 72     UE Strength  Left  2/1/23 Right  2/1/23   Shoulder Flex 4/5 4/5   Shoulder ABd (C5 Axillary) 4/5 4/5       RESTRICTIONS/PRECAUTIONS: osteopenia    Exercises/Interventions:   Therapeutic Ex (51533)  Min: Resistance/Repetitions Notes             T-slide  Rows  Lat pulls  Reverse butterfly  ABD  PNF D2   Green 30x  Green 30x  Pink 2x10  Yellow 2x10 L/R  Yellow 2x10              Reverse corner pressouts   2x10    Pec door stretch 3x15\"         Manual Intervention (82543)  Min: Avoid manipulation- osteopenia                   Mob T2/3 central PA grade III    STM  C6-T4 paraspinals B         NMR re-education (04157)  Min:     Prone lats  Prone mid trap  Prone low trap 10x  10x  5x    Isometric SB neck L/R 10x each    Chin tuck with neck flexion 10x         Therapeutic Activity (11543)  Min:               Modalities  Min:                  Other Therapeutic Activities:  Pt was educated on PT POC, Diagnosis, Prognosis, pathomechanics as well as frequency and duration of scheduling future physical therapy appointments. Time was also taken on this day to answer all patient questions and participation in PT. Reviewed appointment policy in detail with patient and patient verbalized understanding. Home Exercise Program:  Access Code: [email or web address]  URL: [email or web address]  Date: 02/14/2023  Prepared by: Rollen Wyoming    Exercises  Supine Chin Tuck - 1 x daily - 7 x weekly - 10 reps - 5 hold  Standing Shoulder Shrugs - 1 x daily - 7 x weekly - 10 reps - 5 hold  Standing Scapular Retraction - 1 x daily - 7 x weekly - 10 reps - 5 hold  Standing Shoulder Posterior Capsule Stretch - 1 x daily - 7 x weekly - 1 reps - 30 hold  Doorway Pec Stretch at 90 Degrees Abduction - 1 x daily - 7 x weekly - 3 reps - 15 hold  Prone Scapular Slide with Shoulder Extension - 1 x daily - 7 x weekly - 10 reps - 5 hold  Prone Scapular Retraction Arms at Side - 1 x daily - 7 x weekly - 10 reps - 5 hold  Quadruped Cervical Retraction - 1 x daily - 7 x weekly - 10 reps - 5 hold        Therapeutic Exercise and NMR EXR  [] (73499) Provided verbal/tactile cueing for activities related to strengthening, flexibility, endurance, ROM  for improvements in cervical, postural, scapular, scapulothoracic and UE control with self care, reaching, carrying, lifting, house/yardwork, driving/computer work.    [] (68304) Provided verbal/tactile cueing for activities related to improving balance, coordination, kinesthetic sense, posture, motor skill, proprioception  to assist with cervical, scapular, scapulothoracic and UE control with self care, reaching, carrying, lifting, house/yardwork, driving/computer work. Therapeutic Activities:    [] (96934 or 49780) Provided verbal/tactile cueing for activities related to improving balance, coordination, kinesthetic sense, posture, motor skill, proprioception and motor activation to allow for proper function of cervical, scapular, scapulothoracic and UE control with self care, carrying, lifting, driving/computer work.      Home Exercise Program:    [] (20691) Reviewed/Progressed HEP activities related to strengthening, flexibility, endurance, ROM of cervical, scapular, scapulothoracic and UE control with self care, reaching, carrying, lifting, house/yardwork, driving/computer work  [] (83969) Reviewed/Progressed HEP activities related to improving balance, coordination, kinesthetic sense, posture, motor skill, proprioception of cervical, scapular, scapulothoracic and UE control with self care, reaching, carrying, lifting, house/yardwork, driving/computer work      Manual Treatments:  PROM / STM / Oscillations-Mobs:  G-I, II, III, IV (Valentino, Inf., Post.)  [] (14320) Provided manual therapy to mobilize soft tissue/joints of cervical/CT, scapular GHJ and UE for the purpose of decreasing headache, modulating pain, promoting relaxation,  increasing ROM, reducing/eliminating soft tissue swelling/inflammation/restriction, improving soft tissue extensibility and allowing for proper ROM for normal function with self care, reaching, carrying, lifting, house/yardwork, driving/computer work      Charges:  Timed Code Treatment Minutes: 45   Total Treatment Minutes: 45     [] EVAL (LOW) 47498 (typically 20 minutes face-to-face)  [] EVAL (MOD) 88063 (typically 30 minutes face-to-face)  [] EVAL (HIGH) 86461 (typically 45 minutes face-to-face)  [] RE-EVAL     [x] TE(36544) x     [] Dry needle 1 or 2 Muscles (20561)  [x] NMR (14420) x     [] Dry needle 3+ Muscles (20562)  [x] Manual (93206) x     [] Ultrasound (12167) x  [] TA (29907) x     [] Mech Traction (10873)  [] ES(attended) (30775)     [] ES (un) (50730):   [] Vasopump (12225) [] Ionto (97223)   [] Other:    GOALS:  Patient stated goal: \"no pain\"  [] Progressing: [] Met: [] Not Met: [] Adjusted     Therapist goals for Patient:   Short Term Goals: To be achieved in: 2 weeks  1. Independent in HEP and progression per patient tolerance, in order to prevent re-injury.   [] Progressing: [] Met: [] Not Met: [] Adjusted  2. Patient will have a decrease in pain to facilitate improvement in movement, function, and ADLs as indicated by Functional Deficits.  [] Progressing: [] Met: [] Not Met: [] Adjusted     Long Term Goals: To be achieved in: 6 weeks  1. Disability index score of 7% or less for the NDI to assist with reaching prior level of function.   [] Progressing: [] Met: [] Not Met: [] Adjusted  2. Patient will demonstrate increased AROM to WFL of  cervical/thoracic spine to allow for proper joint functioning as indicated by patients Functional Deficits. [] Progressing: [] Met: [] Not Met: [] Adjusted  3. Patient will demonstrate an increase in postural awareness and control and activation of  Deep cervical stabilizers to allow for proper functional mobility as indicated by patients Functional Deficits. [] Progressing: [] Met: [] Not Met: [] Adjusted  4. Patient will return to comb her hair with the L hand without increased symptoms or restriction. 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 [] Progressing: [] Met: [] Not Met: [] Adjusted         ASSESSMENT:  See eval    Treatment/Activity Tolerance:  [x] Patient tolerated treatment well [] Patient limited by fatique  [] Patient limited by pain  [] Patient limited by other medical complications  [] Other:     Overall Progression Towards Functional goals/ Treatment Progress Update:  [] Patient is progressing as expected towards functional goals listed. [] Progression is slowed due to complexities/Impairments listed. [] Progression has been slowed due to co-morbidities.   [x] Plan just implemented, too soon to assess goals progression <30days   [] Goals require adjustment due to lack of progress  [] Patient is not progressing as expected and requires additional follow up with physician  [] Other    Prognosis for POC: [x] Good [] Fair  [] Poor    Patient requires continued skilled intervention: [x] Yes  [] No        PLAN: See eval. Begin t-slide scapular exercises and chin tuck progression  [] Continue per plan of care [] Alter current plan (see comments)  [x] Plan of care initiated [] Hold pending MD visit [] Discharge    Electronically signed by: Hans Arellano PT , OMT-C, [id number]      Note: If patient does not return for scheduled/recommended follow up visits, this note will serve as a discharge from care along with the most recent update on progress.

## 2023-02-20 ENCOUNTER — HOSPITAL ENCOUNTER (OUTPATIENT)
Dept: PHYSICAL THERAPY | Age: 68
Setting detail: THERAPIES SERIES
Discharge: HOME | End: 2023-02-20
Payer: COMMERCIAL

## 2023-02-20 PROCEDURE — 97112 NEUROMUSCULAR REEDUCATION: CPT

## 2023-02-20 PROCEDURE — 97110 THERAPEUTIC EXERCISES: CPT

## 2023-02-20 PROCEDURE — 97140 MANUAL THERAPY 1/> REGIONS: CPT

## 2023-02-20 NOTE — FLOWSHEET NOTE
[facility] [address] [place] [address]  Phone: [phone]   Fax:     [phone]    Physical Therapy Treatment Note/ Progress Report:     Date:  2023    Patient Name:  Sid Rome    :  1955  MRN: [id number]    Pertinent Medical History:      Referring Provider:   Hershal Holter, APRN-NP                            Evaluation Date: 2023                                                   Medical Diagnosis:  DDD (degenerative disc disease), cervical [M50.30]     Treatment Diagnosis:      ICD-10-CM     1. Decreased activities of daily living (ADL)  Z78.9                                      Insurance information: [facility] Choice- allowed 60/yr  Plan of care signed (Y/N): Y    Date of Patient follow up with Physician:      Progress Report: []  Yes  [x]  No     Date Range for reporting period:  Beginnin2023  Ending:     Progress report due (10 Rx/or 30 days whichever is less):     Recertification due (POC duration/ or 90 days whichever is less):     Visit # Insurance/POC Allowable Auth Needed   6 12 []Yes    [x]No     Functional Outcomes Measure:     Test:NDI  Score: 23 5 raw; 10% dysfunction; 23 8 raw, 16% dysfunction; 23 0 raw, 0% dysfunction    Pain level:  2-8/10 L neck    History of Injury:Patient stated she has lower cervical spine and L scapular pain, L suboccipital.  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 Pain started 20 years ago and has gradually become worse. Pt took one week off of steroid and took an oral steroid and muscle relaxer 1 week ago and pain reduced. Pt has L sided headaches occasionally which \"starts with stress. \"  Pt has pain when lifting L UE overhead to lift items overhead and to fix her hair.        SUBJECTIVE:  See eval  23 L neck pain is rated 2/10 currently; pt stated therapy has been \"fine. \"  Pt stated \"I think the exercises have helped me. \"  Pt stated it is easier to look over the left shoulder and has no pain at work. 2/14/23 pt stated \"it feels better. \"  Pt has no neck pain but \"I still have pain between the shoulder blades. \"  2/20/23 pt stated she is doing better ,that scapular pain is improving and that pain is rated 0/10; neck pain is 0/10 today. OBJECTIVE:   Observation:   Test measurements:      CERV ROM 2/1/23 2/9/23 2/20/23 2/20/23   Cervical Flexion 35     45    Cervical Extension 55     50      Left Right L 2/9/23 R 2/9/23 L 2/20/23 R 2/20/23   Cervical SB 25 35   30 30   Cervical rotation 48 68 70 72 70 74     UE Strength  Left  2/1/23 Right  2/1/23 L  2/20/23 R  2/20/23   Shoulder Flex 4/5 4/5 4+/5 4+/5   Shoulder ABd (C5 Axillary) 4/5 4/5 4+/5 4+/5       RESTRICTIONS/PRECAUTIONS: osteopenia    Exercises/Interventions:   Therapeutic Ex (47001)  Min: Resistance/Repetitions Notes             T-slide  Rows  Lat pulls  Reverse butterfly  ABD  PNF D2   Green 30x  Green 30x  Pink 2x10  Yellow 2x10 L/R  Yellow 2x10         Levator scap stretch  L and R 2x30\" L and R, sitting    Reverse corner pressouts   2x10    Pec door stretch 3x15\"         Manual Intervention (84523)  Min: Avoid manipulation- osteopenia                   Mob T2/3 central PA grade III    STM  C6-T4 paraspinals B         NMR re-education (70175)  Min:     Prone lats  Prone mid trap  Prone low trap 10x  10x  5x    Isometric SB neck L/R 10x each    Chin tuck with neck flexion 10x         Therapeutic Activity (52989)  Min:               Modalities  Min:                  Other Therapeutic Activities:  Pt was educated on PT POC, Diagnosis, Prognosis, pathomechanics as well as frequency and duration of scheduling future physical therapy appointments. Time was also taken on this day to answer all patient questions and participation in PT.  Reviewed appointment policy in detail with patient and patient verbalized understanding.     Home Exercise Program:  Access Code: [email or web address]  URL: [email or web address]  Date: 02/14/2023  Prepared by: Bar Smith    Exercises  Supine Chin Tuck - 1 x daily - 7 x weekly - 10 reps - 5 hold  Standing Shoulder Shrugs - 1 x daily - 7 x weekly - 10 reps - 5 hold  Standing Scapular Retraction - 1 x daily - 7 x weekly - 10 reps - 5 hold  Standing Shoulder Posterior Capsule Stretch - 1 x daily - 7 x weekly - 1 reps - 30 hold  Doorway Pec Stretch at 90 Degrees Abduction - 1 x daily - 7 x weekly - 3 reps - 15 hold  Prone Scapular Slide with Shoulder Extension - 1 x daily - 7 x weekly - 10 reps - 5 hold  Prone Scapular Retraction Arms at Side - 1 x daily - 7 x weekly - 10 reps - 5 hold  Quadruped Cervical Retraction - 1 x daily - 7 x weekly - 10 reps - 5 hold        Therapeutic Exercise and NMR EXR  [] (18358) Provided verbal/tactile cueing for activities related to strengthening, flexibility, endurance, ROM  for improvements in cervical, postural, scapular, scapulothoracic and UE control with self care, reaching, carrying, lifting, house/yardwork, driving/computer work.    [] (07409) Provided verbal/tactile cueing for activities related to improving balance, coordination, kinesthetic sense, posture, motor skill, proprioception  to assist with cervical, scapular, scapulothoracic and UE control with self care, reaching, carrying, lifting, house/yardwork, driving/computer work.    Therapeutic Activities:    [] (11092 or 33875) Provided verbal/tactile cueing for activities related to improving balance, coordination, kinesthetic sense, posture, motor skill, proprioception and motor activation to allow for proper function of cervical, scapular, scapulothoracic and UE control with self care, carrying, lifting, driving/computer work.     Home Exercise Program:    [] (53324) Reviewed/Progressed HEP activities related to strengthening, flexibility, endurance, ROM of cervical, scapular, scapulothoracic and  UE control with self care, reaching, carrying, lifting, house/yardwork, driving/computer work  [] (12581) Reviewed/Progressed HEP activities related to improving balance, coordination, kinesthetic sense, posture, motor skill, proprioception of cervical, scapular, scapulothoracic and UE control with self care, reaching, carrying, lifting, house/yardwork, driving/computer work      Manual Treatments:  PROM / STM / Oscillations-Mobs:  G-I, II, III, IV (PA's, Inf., Post.)  [] ([address] [facility]) Provided manual therapy to mobilize soft tissue/joints of cervical/CT, scapular GHJ and UE for the purpose of decreasing headache, modulating pain, promoting relaxation,  increasing ROM, reducing/eliminating soft tissue swelling/inflammation/restriction, improving soft tissue extensibility and allowing for proper ROM for normal function with self care, reaching, carrying, lifting, house/yardwork, driving/computer work      Charges:  Timed Code Treatment Minutes: 45   Total Treatment Minutes: 45     [] EVAL (LOW) 13787 (typically 20 minutes face-to-face)  [] EVAL (MOD) 09079 (typically 30 minutes face-to-face)  [] EVAL (HIGH) 85940 (typically 45 minutes face-to-face)  [] RE-EVAL     [x] UQ(94058) x     [] Dry needle 1 or 2 Muscles (20195)  [x] NMR (47743) x     [] Dry needle 3+ Muscles (65086)  [x] Manual (99426) x     [] Ultrasound (13041) x  [] TA (07928) x     [] Mech Traction (24906)  [] ES(attended) (24345)     [] ES (un) (18124):   [] Vasopump (85763) [] Ionto (04028)   [] Other:    GOALS:  Patient stated goal: \"no pain\"  [] Progressing: [] Met: [] Not Met: [] Adjusted     Therapist goals for Patient:   Short Term Goals: To be achieved in: 2 weeks  1. Independent in HEP and progression per patient tolerance, in order to prevent re-injury. [] Progressing: [] Met: [] Not Met: [] Adjusted  2. Patient will have a decrease in pain to facilitate improvement in movement, function, and ADLs as indicated by Functional Deficits.   [] Progressing: [] Met: [] Not Met: [] Adjusted     Long Term Goals: To be achieved in: 6 weeks  1. Disability index score of 7% or less for the NDI to assist with reaching prior level of function. [] Progressing: [] Met: [] Not Met: [] Adjusted  2. Patient will demonstrate increased AROM to [facility] of cervical/thoracic spine to allow for proper joint functioning as indicated by patients Functional Deficits. [] Progressing: [] Met: [] Not Met: [] Adjusted  3. Patient will demonstrate an increase in postural awareness and control and activation of  Deep cervical stabilizers to allow for proper functional mobility as indicated by patients Functional Deficits. [] Progressing: [] Met: [] Not Met: [] Adjusted  4. Patient will return to comb her hair with the L hand without increased symptoms or restriction. 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 [] Progressing: [] Met: [] Not Met: [] Adjusted         ASSESSMENT:  See eval    Treatment/Activity Tolerance:  [x] Patient tolerated treatment well [] Patient limited by fatique  [] Patient limited by pain  [] Patient limited by other medical complications  [] Other:     Overall Progression Towards Functional goals/ Treatment Progress Update:  [] Patient is progressing as expected towards functional goals listed. [] Progression is slowed due to complexities/Impairments listed. [] Progression has been slowed due to co-morbidities.   [x] Plan just implemented, too soon to assess goals progression <30days   [] Goals require adjustment due to lack of progress  [] Patient is not progressing as expected and requires additional follow up with physician  [] Other    Prognosis for POC: [x] Good [] Fair  [] Poor    Patient requires continued skilled intervention: [x] Yes  [] No        PLAN: See eval. Begin t-slide scapular exercises and chin tuck progression  [] Continue per plan of care [] Adalid Hernandez current plan (see comments)  [x] Plan of care initiated [] Hold pending MD visit [] Discharge    Electronically signed by: Javy Sandoval PT , O[place], [id number]      Note: If patient does not return for scheduled/recommended follow up visits, this note will serve as a discharge from care along with the most recent update on progress.

## 2023-02-23 ENCOUNTER — HOSPITAL ENCOUNTER (OUTPATIENT)
Dept: PHYSICAL THERAPY | Age: 68
Setting detail: THERAPIES SERIES
Discharge: HOME | End: 2023-02-23
Payer: COMMERCIAL

## 2023-02-23 PROCEDURE — 97112 NEUROMUSCULAR REEDUCATION: CPT

## 2023-02-23 PROCEDURE — 97140 MANUAL THERAPY 1/> REGIONS: CPT

## 2023-02-23 PROCEDURE — 97110 THERAPEUTIC EXERCISES: CPT

## 2023-02-23 NOTE — FLOWSHEET NOTE
[facility] [address] [address]  Phone: [phone]   Fax:     [phone]    Physical Therapy Treatment Note/ Progress Report:     Date:  2023    Patient Name:  Alvina Gupta    :  1955  MRN: [id number]    Pertinent Medical History:      Referring Provider:   DREW Martinez                            Evaluation Date: 2023                                                   Medical Diagnosis:  DDD (degenerative disc disease), cervical [M50.30]     Treatment Diagnosis:      ICD-10-CM     1. Decreased activities of daily living (ADL)  Z78.9                                      Insurance information: [facility] Choice- allowed 60/yr  Plan of care signed (Y/N): Y    Date of Patient follow up with Physician:      Progress Report: []  Yes  [x]  No     Date Range for reporting period:  Beginnin2023  Ending:     Progress report due (10 Rx/or 30 days whichever is less):     Recertification due (POC duration/ or 90 days whichever is less):     Visit # Insurance/POC Allowable Auth Needed   7 12 []Yes    [x]No     Functional Outcomes Measure:     Test:NDI  Score: 23 5 raw; 10% dysfunction; 23 8 raw, 16% dysfunction; 23 0 raw, 0% dysfunction    Pain level:  2-8/10 L neck    History of Injury:Patient stated she has lower cervical spine and L scapular pain, L suboccipital.  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 Pain started 20 years ago and has gradually become worse. Pt took one week off of steroid and took an oral steroid and muscle relaxer 1 week ago and pain reduced. Pt has L sided headaches occasionally which \"starts with stress. \"  Pt has pain when lifting L UE overhead to lift items overhead and to fix her hair.        SUBJECTIVE:  See eval  23 L neck pain is rated 2/10 currently; pt stated therapy has been \"fine. \"  Pt stated \"I think the exercises have helped me. \"  Pt stated it is easier to look over the left shoulder and has no pain at work. 2/14/23 pt stated \"it feels better. \"  Pt has no neck pain but \"I still have pain between the shoulder blades. \"  2/20/23 pt stated she is doing better ,that scapular pain is improving and that pain is rated 0/10; neck pain is 0/10 today. OBJECTIVE:   Observation:   Test measurements:      CERV ROM 2/1/23 2/9/23 2/20/23 2/20/23   Cervical Flexion 35     45    Cervical Extension 55     50      Left Right L 2/9/23 R 2/9/23 L 2/20/23 R 2/20/23   Cervical SB 25 35   30 30   Cervical rotation 48 68 70 72 70 74     UE Strength  Left  2/1/23 Right  2/1/23 L  2/20/23 R  2/20/23   Shoulder Flex 4/5 4/5 4+/5 4+/5   Shoulder ABd (C5 Axillary) 4/5 4/5 4+/5 4+/5       RESTRICTIONS/PRECAUTIONS: osteopenia    Exercises/Interventions:   Therapeutic Ex (01314)  Min: Resistance/Repetitions Notes             T-slide  Rows  Lat pulls  Reverse butterfly  ABD  PNF D2   Green 30x  Green 30x  Pink 2x10  Green 2x10 L/R  Yellow 2x10         Levator scap stretch  L and R 2x30\" L and R, sitting    Reverse corner pressouts   2x10    Pec door stretch 3x15\"         Manual Intervention (42029)  Min: Avoid manipulation- osteopenia                   Mob T2/3 central PA grade III    STM  C6-T4 paraspinals B         NMR re-education (45772)  Min:     Prone lats  Prone mid trap  Prone low trap 10x  10x  5x    Isometric SB neck L/R 10x each    Chin tuck with neck flexion 10x    Prone chin tuck 10x    Therapeutic Activity (93219)  Min:               Modalities  Min:                  Other Therapeutic Activities:  Pt was educated on PT POC, Diagnosis, Prognosis, pathomechanics as well as frequency and duration of scheduling future physical therapy appointments. Time was also taken on this day to answer all patient questions and participation in PT.  Reviewed appointment policy in detail with patient and patient verbalized understanding. Home Exercise Program:  Access Code: [email or web address]  URL: [email or web address]  Date: 02/14/2023  Prepared by: Luis Perez    Exercises  Supine Chin Tuck - 1 x daily - 7 x weekly - 10 reps - 5 hold  Standing Shoulder Shrugs - 1 x daily - 7 x weekly - 10 reps - 5 hold  Standing Scapular Retraction - 1 x daily - 7 x weekly - 10 reps - 5 hold  Standing Shoulder Posterior Capsule Stretch - 1 x daily - 7 x weekly - 1 reps - 30 hold  Doorway Pec Stretch at 90 Degrees Abduction - 1 x daily - 7 x weekly - 3 reps - 15 hold  Prone Scapular Slide with Shoulder Extension - 1 x daily - 7 x weekly - 10 reps - 5 hold  Prone Scapular Retraction Arms at Side - 1 x daily - 7 x weekly - 10 reps - 5 hold  Quadruped Cervical Retraction - 1 x daily - 7 x weekly - 10 reps - 5 hold        Therapeutic Exercise and NMR EXR  [] (00049) Provided verbal/tactile cueing for activities related to strengthening, flexibility, endurance, ROM  for improvements in cervical, postural, scapular, scapulothoracic and UE control with self care, reaching, carrying, lifting, house/yardwork, driving/computer work.    [] (38015) Provided verbal/tactile cueing for activities related to improving balance, coordination, kinesthetic sense, posture, motor skill, proprioception  to assist with cervical, scapular, scapulothoracic and UE control with self care, reaching, carrying, lifting, house/yardwork, driving/computer work. Therapeutic Activities:    [] (89102 or 01818) Provided verbal/tactile cueing for activities related to improving balance, coordination, kinesthetic sense, posture, motor skill, proprioception and motor activation to allow for proper function of cervical, scapular, scapulothoracic and UE control with self care, carrying, lifting, driving/computer work.      Home Exercise Program:    [] (69991) Reviewed/Progressed HEP activities related to strengthening, flexibility, endurance, ROM of cervical, scapular, scapulothoracic and UE control with self care, reaching, carrying, lifting, house/yardwork, driving/computer work  [] (57090) Reviewed/Progressed HEP activities related to improving balance, coordination, kinesthetic sense, posture, motor skill, proprioception of cervical, scapular, scapulothoracic and UE control with self care, reaching, carrying, lifting, house/yardwork, driving/computer work      Manual Treatments:  PROM / STM / Oscillations-Mobs:  G-I, II, III, IV (PA's, Inf., Post.)  [] ([id number]) Provided manual therapy to mobilize soft tissue/joints of cervical/CT, scapular GHJ and UE for the purpose of decreasing headache, modulating pain, promoting relaxation,  increasing ROM, reducing/eliminating soft tissue swelling/inflammation/restriction, improving soft tissue extensibility and allowing for proper ROM for normal function with self care, reaching, carrying, lifting, house/yardwork, driving/computer work      Charges:  Timed Code Treatment Minutes: 45   Total Treatment Minutes: 45     [] EVAL (LOW) 17962 (typically 20 minutes face-to-face)  [] EVAL (MOD) 35089 (typically 30 minutes face-to-face)  [] EVAL (HIGH) 02149 (typically 45 minutes face-to-face)  [] RE-EVAL     [x] FG(84496) x     [] Dry needle 1 or 2 Muscles (58537)  [x] NMR (10610) x     [] Dry needle 3+ Muscles (69439)  [x] Manual (98842) x     [] Ultrasound (01064) x  [] TA (19433) x     [] Mech Traction (44212)  [] ES(attended) (83054)     [] ES (un) (74756):   [] Vasopump (86797) [] Ionto (37855)   [] Other:    GOALS:  Patient stated goal: \"no pain\"  [] Progressing: [] Met: [] Not Met: [] Adjusted     Therapist goals for Patient:   Short Term Goals: To be achieved in: 2 weeks  1. Independent in HEP and progression per patient tolerance, in order to prevent re-injury. [] Progressing: [] Met: [] Not Met: [] Adjusted  2.  Patient will have a decrease in pain to facilitate improvement in movement, function, and ADLs as indicated by Functional Deficits. [] Progressing: [] Met: [] Not Met: [] Adjusted     Long Term Goals: To be achieved in: 6 weeks  1. Disability index score of 7% or less for the NDI to assist with reaching prior level of function. [] Progressing: [] Met: [] Not Met: [] Adjusted  2. Patient will demonstrate increased AROM to [facility] of cervical/thoracic spine to allow for proper joint functioning as indicated by patients Functional Deficits. [] Progressing: [] Met: [] Not Met: [] Adjusted  3. Patient will demonstrate an increase in postural awareness and control and activation of  Deep cervical stabilizers to allow for proper functional mobility as indicated by patients Functional Deficits. [] Progressing: [] Met: [] Not Met: [] Adjusted  4. Patient will return to comb her hair with the L hand without increased symptoms or restriction. 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 [] Progressing: [] Met: [] Not Met: [] Adjusted         ASSESSMENT:  See eval    Treatment/Activity Tolerance:  [x] Patient tolerated treatment well [] Patient limited by fatique  [] Patient limited by pain  [] Patient limited by other medical complications  [] Other:     Overall Progression Towards Functional goals/ Treatment Progress Update:  [] Patient is progressing as expected towards functional goals listed. [] Progression is slowed due to complexities/Impairments listed. [] Progression has been slowed due to co-morbidities.   [x] Plan just implemented, too soon to assess goals progression <30days   [] Goals require adjustment due to lack of progress  [] Patient is not progressing as expected and requires additional follow up with physician  [] Other    Prognosis for POC: [x] Good [] Fair  [] Poor    Patient requires continued skilled intervention: [x] Yes  [] No        PLAN: See eval. Begin t-slide scapular exercises and chin tuck progression  [] Continue per plan of care [] Alter current plan (see comments)  [x] Plan of care initiated [] Hold pending MD visit [] Discharge    Electronically signed by: Lenard Galaviz PT , RYLEY-C, [id number]      Note: If patient does not return for scheduled/recommended follow up visits, this note will serve as a discharge from care along with the most recent update on progress.

## 2023-02-27 ENCOUNTER — APPOINTMENT (OUTPATIENT)
Dept: PHYSICAL THERAPY | Age: 68
End: 2023-02-27
Payer: COMMERCIAL

## 2023-05-29 SDOH — HEALTH STABILITY: PHYSICAL HEALTH: ON AVERAGE, HOW MANY DAYS PER WEEK DO YOU ENGAGE IN MODERATE TO STRENUOUS EXERCISE (LIKE A BRISK WALK)?: 6 DAYS

## 2023-05-29 SDOH — HEALTH STABILITY: PHYSICAL HEALTH: ON AVERAGE, HOW MANY MINUTES DO YOU ENGAGE IN EXERCISE AT THIS LEVEL?: 40 MIN

## 2023-05-29 ASSESSMENT — LIFESTYLE VARIABLES
HOW MANY STANDARD DRINKS CONTAINING ALCOHOL DO YOU HAVE ON A TYPICAL DAY: 1 OR 2
HOW OFTEN DO YOU HAVE A DRINK CONTAINING ALCOHOL: MONTHLY OR LESS
HOW OFTEN DO YOU HAVE SIX OR MORE DRINKS ON ONE OCCASION: 1
HOW MANY STANDARD DRINKS CONTAINING ALCOHOL DO YOU HAVE ON A TYPICAL DAY: 1
HOW OFTEN DO YOU HAVE A DRINK CONTAINING ALCOHOL: 2

## 2023-05-29 ASSESSMENT — PATIENT HEALTH QUESTIONNAIRE - PHQ9
1. LITTLE INTEREST OR PLEASURE IN DOING THINGS: 0
2. FEELING DOWN, DEPRESSED OR HOPELESS: 0
SUM OF ALL RESPONSES TO PHQ QUESTIONS 1-9: 0
SUM OF ALL RESPONSES TO PHQ9 QUESTIONS 1 & 2: 0
SUM OF ALL RESPONSES TO PHQ QUESTIONS 1-9: 0

## 2023-06-01 ENCOUNTER — OFFICE VISIT (OUTPATIENT)
Dept: FAMILY MEDICINE CLINIC | Age: 68
End: 2023-06-01
Payer: COMMERCIAL

## 2023-06-01 VITALS
HEIGHT: 66 IN | TEMPERATURE: 96.8 F | HEART RATE: 67 BPM | WEIGHT: 178.8 LBS | DIASTOLIC BLOOD PRESSURE: 82 MMHG | BODY MASS INDEX: 28.73 KG/M2 | SYSTOLIC BLOOD PRESSURE: 124 MMHG | OXYGEN SATURATION: 97 %

## 2023-06-01 DIAGNOSIS — E78.00 PURE HYPERCHOLESTEROLEMIA: ICD-10-CM

## 2023-06-01 DIAGNOSIS — M85.80 OSTEOPENIA, UNSPECIFIED LOCATION: ICD-10-CM

## 2023-06-01 DIAGNOSIS — Z00.00 ANNUAL PHYSICAL EXAM: Primary | ICD-10-CM

## 2023-06-01 DIAGNOSIS — I10 HYPERTENSION, UNSPECIFIED TYPE: ICD-10-CM

## 2023-06-01 DIAGNOSIS — R73.03 PREDIABETES: ICD-10-CM

## 2023-06-01 DIAGNOSIS — K63.5 POLYP OF COLON, UNSPECIFIED PART OF COLON, UNSPECIFIED TYPE: ICD-10-CM

## 2023-06-01 LAB
ALBUMIN SERPL-MCNC: 4.3 G/DL (ref 3.4–5)
ALBUMIN/GLOB SERPL: 1.3 {RATIO} (ref 1.1–2.2)
ALP SERPL-CCNC: 104 U/L (ref 40–129)
ALT SERPL-CCNC: 11 U/L (ref 10–40)
ANION GAP SERPL CALCULATED.3IONS-SCNC: 12 MMOL/L (ref 3–16)
AST SERPL-CCNC: 12 U/L (ref 15–37)
BILIRUB SERPL-MCNC: 0.6 MG/DL (ref 0–1)
BUN SERPL-MCNC: 14 MG/DL (ref 7–20)
CALCIUM SERPL-MCNC: 9.7 MG/DL (ref 8.3–10.6)
CHLORIDE SERPL-SCNC: 104 MMOL/L (ref 99–110)
CHOLEST SERPL-MCNC: 171 MG/DL (ref 0–199)
CO2 SERPL-SCNC: 23 MMOL/L (ref 21–32)
CREAT SERPL-MCNC: 0.8 MG/DL (ref 0.6–1.2)
GFR SERPLBLD CREATININE-BSD FMLA CKD-EPI: >60 ML/MIN/{1.73_M2}
GLUCOSE SERPL-MCNC: 103 MG/DL (ref 70–99)
HDLC SERPL-MCNC: 53 MG/DL (ref 40–60)
LDL CHOLESTEROL CALCULATED: 99 MG/DL
POTASSIUM SERPL-SCNC: 4.3 MMOL/L (ref 3.5–5.1)
PROT SERPL-MCNC: 7.7 G/DL (ref 6.4–8.2)
SODIUM SERPL-SCNC: 139 MMOL/L (ref 136–145)
TRIGL SERPL-MCNC: 95 MG/DL (ref 0–150)
VLDLC SERPL CALC-MCNC: 19 MG/DL

## 2023-06-01 PROCEDURE — 3074F SYST BP LT 130 MM HG: CPT | Performed by: NURSE PRACTITIONER

## 2023-06-01 PROCEDURE — 3078F DIAST BP <80 MM HG: CPT | Performed by: NURSE PRACTITIONER

## 2023-06-01 PROCEDURE — 99397 PER PM REEVAL EST PAT 65+ YR: CPT | Performed by: NURSE PRACTITIONER

## 2023-06-01 RX ORDER — DILTIAZEM HYDROCHLORIDE 240 MG/1
240 CAPSULE, COATED, EXTENDED RELEASE ORAL DAILY
Qty: 90 CAPSULE | Refills: 1 | Status: SHIPPED | OUTPATIENT
Start: 2023-06-01 | End: 2023-08-30

## 2023-06-01 RX ORDER — ATORVASTATIN CALCIUM 40 MG/1
40 TABLET, FILM COATED ORAL DAILY
Qty: 90 TABLET | Refills: 3 | Status: SHIPPED | OUTPATIENT
Start: 2023-06-01

## 2023-06-01 RX ORDER — FLUTICASONE PROPIONATE 50 MCG
SPRAY, SUSPENSION (ML) NASAL
Qty: 16 G | Refills: 5 | Status: SHIPPED | OUTPATIENT
Start: 2023-06-01

## 2023-06-01 RX ORDER — MONTELUKAST SODIUM 10 MG/1
10 TABLET ORAL NIGHTLY
Qty: 90 TABLET | Refills: 3 | Status: SHIPPED | OUTPATIENT
Start: 2023-06-01

## 2023-06-01 RX ORDER — DILTIAZEM HYDROCHLORIDE 240 MG/1
240 CAPSULE, COATED, EXTENDED RELEASE ORAL DAILY
Qty: 90 CAPSULE | Refills: 1 | Status: CANCELLED | OUTPATIENT
Start: 2023-06-01 | End: 2023-08-30

## 2023-06-01 SDOH — ECONOMIC STABILITY: INCOME INSECURITY: HOW HARD IS IT FOR YOU TO PAY FOR THE VERY BASICS LIKE FOOD, HOUSING, MEDICAL CARE, AND HEATING?: NOT HARD AT ALL

## 2023-06-01 SDOH — ECONOMIC STABILITY: HOUSING INSECURITY
IN THE LAST 12 MONTHS, WAS THERE A TIME WHEN YOU DID NOT HAVE A STEADY PLACE TO SLEEP OR SLEPT IN A SHELTER (INCLUDING NOW)?: NO

## 2023-06-01 SDOH — ECONOMIC STABILITY: FOOD INSECURITY: WITHIN THE PAST 12 MONTHS, THE FOOD YOU BOUGHT JUST DIDN'T LAST AND YOU DIDN'T HAVE MONEY TO GET MORE.: NEVER TRUE

## 2023-06-01 SDOH — ECONOMIC STABILITY: FOOD INSECURITY: WITHIN THE PAST 12 MONTHS, YOU WORRIED THAT YOUR FOOD WOULD RUN OUT BEFORE YOU GOT MONEY TO BUY MORE.: NEVER TRUE

## 2023-06-01 ASSESSMENT — ENCOUNTER SYMPTOMS
ABDOMINAL PAIN: 0
CONSTIPATION: 0
NAUSEA: 0

## 2023-06-01 NOTE — PROGRESS NOTES
HISTORY AND PHYSICAL             Date: 6/1/2023        Patient Name: Liza Deluca     YOB: 1955      Age:  76 y.o. Chief Complaint     Chief Complaint   Patient presents with    Annual Exam     Annual physical, Due for colonoscopy. Pt is fasting for labs           History Obtained From   patient    History of Present Illness   Presents today for annual physical.     HTN- does not check at home, feels well. Vision exam up to date. Denies vision concerns, HA, CP, palpations, leg swelling or fatigue. Follows low salt diet. Avoids processed foods. Active with work. Prediabetes- low carb, down 10 lbs. Walks daily avoids sugary foods. Osteopenia- eats lots of yogurt. Walks daily and get steps at park. Mammogram 2/2023 normal due 2024    Colonoscopy 4/3/17: Overdue, 2022 5 yr follow up for tubular adenoma  Past Medical History     Past Medical History:   Diagnosis Date    Allergic rhinitis     saw allergist, dr Kwan Salts of years. had allergy shots in the past    Breast mass in female 1/6/2022    FH: breast cancer 6/1/2021 6.2021 breast mom and sister. History of adenomatous polyp of colon 4/17/2017    Hyperlipidemia LDL goal < 130     Hypertension     Osteopenia     Osteoporosis     Prediabetes         Past Surgical History     Past Surgical History:   Procedure Laterality Date    WISDOM TOOTH EXTRACTION          Medications Prior to Admission     Prior to Admission medications    Medication Sig Start Date End Date Taking?  Authorizing Provider   atorvastatin (LIPITOR) 40 MG tablet Take 1 tablet by mouth daily 6/1/23  Yes EDUARDO Sulliavn NP   montelukast (SINGULAIR) 10 MG tablet Take 1 tablet by mouth nightly 6/1/23  Yes EDUARDO Sullivan NP   dilTIAZem (CARDIZEM CD) 240 MG extended release capsule Take 1 capsule by mouth daily 6/1/23 8/30/23 Yes EDUARDO Sullivan NP   fluticasone Cas Valery) 50 MCG/ACT nasal spray 2 puffs per nose daily 6/1/23  Yes Srinivasan Feng

## 2023-06-02 LAB
EST. AVERAGE GLUCOSE BLD GHB EST-MCNC: 134.1 MG/DL
HBA1C MFR BLD: 6.3 %

## 2023-11-27 RX ORDER — FLUTICASONE PROPIONATE 50 MCG
SPRAY, SUSPENSION (ML) NASAL
Qty: 48 EACH | Refills: 1 | Status: SHIPPED | OUTPATIENT
Start: 2023-11-27

## 2024-02-09 ENCOUNTER — HOSPITAL ENCOUNTER (OUTPATIENT)
Dept: WOMENS IMAGING | Age: 69
Discharge: HOME | End: 2024-02-09
Payer: COMMERCIAL

## 2024-02-09 ENCOUNTER — OFFICE VISIT (OUTPATIENT)
Dept: BREAST CENTER | Age: 69
End: 2024-02-09

## 2024-02-09 VITALS
BODY MASS INDEX: 30.05 KG/M2 | SYSTOLIC BLOOD PRESSURE: 124 MMHG | RESPIRATION RATE: 16 BRPM | WEIGHT: 187 LBS | DIASTOLIC BLOOD PRESSURE: 84 MMHG | HEIGHT: 66 IN

## 2024-02-09 DIAGNOSIS — Z12.31 ENCOUNTER FOR SCREENING MAMMOGRAM FOR BREAST CANCER: ICD-10-CM

## 2024-02-09 DIAGNOSIS — Z12.39 ENCOUNTER FOR SCREENING BREAST EXAMINATION: Primary | ICD-10-CM

## 2024-02-09 DIAGNOSIS — Z80.3 FAMILY HISTORY OF BREAST CANCER: ICD-10-CM

## 2024-02-09 PROCEDURE — 77063 BREAST TOMOSYNTHESIS BI: CPT

## 2024-02-09 NOTE — PROGRESS NOTES
recent breast imaging findings.  resolved.  Breast Pain - secondary to above.  Resolved.   Family history of breast cancer-mother, sister, niece     PLAN:   Continue annual screening mammography with tomosynthesis  Continue annual clinical breast exams   Discussed the importance of breast awareness including the importance and technique of self breast exams  Today's imaging was reviewed, documented above, results were discussed with the patient, all questions answered  Education provided for Healthy Lifestyle Recommendations: healthy diet, routine exercise, weight control, decreased alcohol consumption.  Follow up after annual mammogram in 1 year          All of the patient's questions were answered at this time however, she was encouraged to call the office with any further inquiries.    Approximately 20 minutes of time were spent in preparation, direct patient contact, counseling, care coordination, documentation and activities otherwise related to this encounter.          EDUARDO Roldan-CNP  [facility]   Surgical Breast Oncology   [phone]

## 2024-02-23 DIAGNOSIS — I10 HYPERTENSION, UNSPECIFIED TYPE: ICD-10-CM

## 2024-02-23 RX ORDER — DILTIAZEM HYDROCHLORIDE 240 MG/1
CAPSULE, COATED, EXTENDED RELEASE ORAL DAILY
Qty: 90 CAPSULE | Refills: 1 | Status: SHIPPED | OUTPATIENT
Start: 2024-02-23

## 2024-05-16 RX ORDER — MONTELUKAST SODIUM 10 MG/1
10 TABLET ORAL NIGHTLY
Qty: 90 TABLET | Refills: 1 | Status: SHIPPED | OUTPATIENT
Start: 2024-05-16

## 2024-05-30 ASSESSMENT — PATIENT HEALTH QUESTIONNAIRE - PHQ9
SUM OF ALL RESPONSES TO PHQ9 QUESTIONS 1 & 2: 0
1. LITTLE INTEREST OR PLEASURE IN DOING THINGS: NOT AT ALL
2. FEELING DOWN, DEPRESSED OR HOPELESS: NOT AT ALL
SUM OF ALL RESPONSES TO PHQ QUESTIONS 1-9: 0
1. LITTLE INTEREST OR PLEASURE IN DOING THINGS: NOT AT ALL
SUM OF ALL RESPONSES TO PHQ9 QUESTIONS 1 & 2: 0
SUM OF ALL RESPONSES TO PHQ QUESTIONS 1-9: 0
2. FEELING DOWN, DEPRESSED OR HOPELESS: NOT AT ALL

## 2024-05-31 ENCOUNTER — OFFICE VISIT (OUTPATIENT)
Dept: FAMILY MEDICINE CLINIC | Age: 69
End: 2024-05-31
Payer: COMMERCIAL

## 2024-05-31 VITALS
OXYGEN SATURATION: 95 % | TEMPERATURE: 98.3 F | BODY MASS INDEX: 29.67 KG/M2 | DIASTOLIC BLOOD PRESSURE: 80 MMHG | SYSTOLIC BLOOD PRESSURE: 120 MMHG | HEART RATE: 82 BPM | WEIGHT: 184.6 LBS | HEIGHT: 66 IN

## 2024-05-31 DIAGNOSIS — R73.03 PREDIABETES: ICD-10-CM

## 2024-05-31 DIAGNOSIS — I10 HYPERTENSION, UNSPECIFIED TYPE: ICD-10-CM

## 2024-05-31 DIAGNOSIS — E78.00 PURE HYPERCHOLESTEROLEMIA: ICD-10-CM

## 2024-05-31 DIAGNOSIS — Z00.00 ANNUAL PHYSICAL EXAM: Primary | ICD-10-CM

## 2024-05-31 LAB
ALBUMIN SERPL-MCNC: 4.3 G/DL (ref 3.4–5)
ALBUMIN/GLOB SERPL: 1.4 {RATIO} (ref 1.1–2.2)
ALP SERPL-CCNC: 108 U/L (ref 40–129)
ALT SERPL-CCNC: 13 U/L (ref 10–40)
ANION GAP SERPL CALCULATED.3IONS-SCNC: 14 MMOL/L (ref 3–16)
AST SERPL-CCNC: 13 U/L (ref 15–37)
BILIRUB SERPL-MCNC: 0.6 MG/DL (ref 0–1)
BUN SERPL-MCNC: 19 MG/DL (ref 7–20)
CALCIUM SERPL-MCNC: 10 MG/DL (ref 8.3–10.6)
CHLORIDE SERPL-SCNC: 104 MMOL/L (ref 99–110)
CHOLEST SERPL-MCNC: 156 MG/DL (ref 0–199)
CO2 SERPL-SCNC: 21 MMOL/L (ref 21–32)
CREAT SERPL-MCNC: 0.8 MG/DL (ref 0.6–1.2)
GFR SERPLBLD CREATININE-BSD FMLA CKD-EPI: 80 ML/MIN/{1.73_M2}
GLUCOSE SERPL-MCNC: 106 MG/DL (ref 70–99)
HDLC SERPL-MCNC: 57 MG/DL (ref 40–60)
LDL CHOLESTEROL: 83 MG/DL
POTASSIUM SERPL-SCNC: 4.3 MMOL/L (ref 3.5–5.1)
PROT SERPL-MCNC: 7.4 G/DL (ref 6.4–8.2)
SODIUM SERPL-SCNC: 139 MMOL/L (ref 136–145)
TRIGL SERPL-MCNC: 80 MG/DL (ref 0–150)
VLDLC SERPL CALC-MCNC: 16 MG/DL

## 2024-05-31 PROCEDURE — 3074F SYST BP LT 130 MM HG: CPT | Performed by: NURSE PRACTITIONER

## 2024-05-31 PROCEDURE — 99397 PER PM REEVAL EST PAT 65+ YR: CPT | Performed by: NURSE PRACTITIONER

## 2024-05-31 PROCEDURE — 3079F DIAST BP 80-89 MM HG: CPT | Performed by: NURSE PRACTITIONER

## 2024-05-31 RX ORDER — ATORVASTATIN CALCIUM 40 MG/1
40 TABLET, FILM COATED ORAL DAILY
Qty: 90 TABLET | Refills: 3 | Status: SHIPPED | OUTPATIENT
Start: 2024-05-31

## 2024-05-31 RX ORDER — DILTIAZEM HYDROCHLORIDE 240 MG/1
240 CAPSULE, COATED, EXTENDED RELEASE ORAL DAILY
Qty: 90 CAPSULE | Refills: 2 | Status: SHIPPED | OUTPATIENT
Start: 2024-05-31

## 2024-05-31 ASSESSMENT — ENCOUNTER SYMPTOMS
COUGH: 0
SHORTNESS OF BREATH: 0
ABDOMINAL PAIN: 0

## 2024-05-31 NOTE — PROGRESS NOTES
sounds.   Abdominal:      General: Bowel sounds are normal.      Palpations: Abdomen is soft. There is no mass.      Tenderness: There is no abdominal tenderness.   Musculoskeletal:      Cervical back: Normal range of motion and neck supple.      Right lower leg: No edema.      Left lower leg: No edema.   Skin:     General: Skin is warm and dry.   Neurological:      Mental Status: She is alert and oriented to person, place, and time.   Psychiatric:         Mood and Affect: Mood normal.              --EDUARDO Ac - NP

## 2024-06-01 LAB
EST. AVERAGE GLUCOSE BLD GHB EST-MCNC: 137 MG/DL
HBA1C MFR BLD: 6.4 %

## 2024-11-07 RX ORDER — MONTELUKAST SODIUM 10 MG/1
10 TABLET ORAL NIGHTLY
Qty: 90 TABLET | Refills: 1 | Status: SHIPPED | OUTPATIENT
Start: 2024-11-07

## 2025-02-18 ENCOUNTER — HOSPITAL ENCOUNTER (OUTPATIENT)
Dept: WOMENS IMAGING | Age: 70
Discharge: HOME | End: 2025-02-18
Payer: COMMERCIAL

## 2025-02-18 VITALS — BODY MASS INDEX: 29.57 KG/M2 | WEIGHT: 184 LBS | HEIGHT: 66 IN

## 2025-02-18 DIAGNOSIS — Z12.31 ENCOUNTER FOR SCREENING MAMMOGRAM FOR BREAST CANCER: ICD-10-CM

## 2025-02-18 DIAGNOSIS — Z12.39 ENCOUNTER FOR SCREENING BREAST EXAMINATION: ICD-10-CM

## 2025-02-18 PROCEDURE — 77063 BREAST TOMOSYNTHESIS BI: CPT

## 2025-03-04 DIAGNOSIS — E78.00 PURE HYPERCHOLESTEROLEMIA: ICD-10-CM

## 2025-03-04 RX ORDER — ATORVASTATIN CALCIUM 40 MG/1
40 TABLET, FILM COATED ORAL DAILY
Qty: 90 TABLET | Refills: 1 | Status: SHIPPED | OUTPATIENT
Start: 2025-03-04

## 2025-05-01 RX ORDER — MONTELUKAST SODIUM 10 MG/1
10 TABLET ORAL NIGHTLY
Qty: 90 TABLET | Refills: 0 | Status: SHIPPED | OUTPATIENT
Start: 2025-05-01

## 2025-05-12 ENCOUNTER — TELEPHONE (OUTPATIENT)
Dept: FAMILY MEDICINE CLINIC | Age: 70
End: 2025-05-12

## 2025-05-12 RX ORDER — METHYLPREDNISOLONE 4 MG/1
TABLET ORAL
Qty: 1 KIT | Refills: 0 | Status: SHIPPED | OUTPATIENT
Start: 2025-05-12 | End: 2025-05-18

## 2025-05-12 NOTE — TELEPHONE ENCOUNTER
Patient fell on her butt and since has been having sciatic nerve pain.  She has had it in the past and was prescribed Steroids, she is asking if you would call in steroids to [facility].       LOV  5/31/24  NOV  6/6/25    Please advise, [phone]

## 2025-06-01 DIAGNOSIS — I10 HYPERTENSION, UNSPECIFIED TYPE: ICD-10-CM

## 2025-06-02 RX ORDER — DILTIAZEM HYDROCHLORIDE 240 MG/1
CAPSULE, COATED, EXTENDED RELEASE ORAL DAILY
Qty: 90 CAPSULE | Refills: 2 | Status: SHIPPED | OUTPATIENT
Start: 2025-06-02

## 2025-06-05 ASSESSMENT — PATIENT HEALTH QUESTIONNAIRE - PHQ9
1. LITTLE INTEREST OR PLEASURE IN DOING THINGS: NOT AT ALL
1. LITTLE INTEREST OR PLEASURE IN DOING THINGS: NOT AT ALL
SUM OF ALL RESPONSES TO PHQ QUESTIONS 1-9: 0
SUM OF ALL RESPONSES TO PHQ9 QUESTIONS 1 & 2: 0
SUM OF ALL RESPONSES TO PHQ QUESTIONS 1-9: 0
2. FEELING DOWN, DEPRESSED OR HOPELESS: NOT AT ALL
2. FEELING DOWN, DEPRESSED OR HOPELESS: NOT AT ALL

## 2025-06-06 ENCOUNTER — OFFICE VISIT (OUTPATIENT)
Dept: FAMILY MEDICINE CLINIC | Age: 70
End: 2025-06-06
Payer: COMMERCIAL

## 2025-06-06 VITALS
TEMPERATURE: 97.7 F | BODY MASS INDEX: 29.09 KG/M2 | HEIGHT: 66 IN | OXYGEN SATURATION: 95 % | HEART RATE: 77 BPM | SYSTOLIC BLOOD PRESSURE: 124 MMHG | DIASTOLIC BLOOD PRESSURE: 88 MMHG | WEIGHT: 181 LBS

## 2025-06-06 DIAGNOSIS — Z00.00 ANNUAL PHYSICAL EXAM: Primary | ICD-10-CM

## 2025-06-06 DIAGNOSIS — I10 HYPERTENSION, UNSPECIFIED TYPE: ICD-10-CM

## 2025-06-06 DIAGNOSIS — R73.03 PREDIABETES: ICD-10-CM

## 2025-06-06 DIAGNOSIS — E78.00 PURE HYPERCHOLESTEROLEMIA: ICD-10-CM

## 2025-06-06 DIAGNOSIS — Z23 NEED FOR SHINGLES VACCINE: ICD-10-CM

## 2025-06-06 LAB
ALBUMIN SERPL-MCNC: 4.2 G/DL (ref 3.4–5)
ALBUMIN/GLOB SERPL: 1.4 {RATIO} (ref 1.1–2.2)
ALP SERPL-CCNC: 137 U/L (ref 40–129)
ALT SERPL-CCNC: 19 U/L (ref 10–40)
ANION GAP SERPL CALCULATED.3IONS-SCNC: 13 MMOL/L (ref 3–16)
AST SERPL-CCNC: 18 U/L (ref 15–37)
BILIRUB SERPL-MCNC: 0.6 MG/DL (ref 0–1)
BUN SERPL-MCNC: 16 MG/DL (ref 7–20)
CALCIUM SERPL-MCNC: 9.6 MG/DL (ref 8.3–10.6)
CHLORIDE SERPL-SCNC: 104 MMOL/L (ref 99–110)
CHOLEST SERPL-MCNC: 229 MG/DL (ref 0–199)
CO2 SERPL-SCNC: 22 MMOL/L (ref 21–32)
CREAT SERPL-MCNC: 0.8 MG/DL (ref 0.6–1.2)
EST. AVERAGE GLUCOSE BLD GHB EST-MCNC: 139.9 MG/DL
GFR SERPLBLD CREATININE-BSD FMLA CKD-EPI: 79 ML/MIN/{1.73_M2}
GLUCOSE SERPL-MCNC: 90 MG/DL (ref 70–99)
HBA1C MFR BLD: 6.5 %
HDLC SERPL-MCNC: 60 MG/DL (ref 40–60)
LDL CHOLESTEROL: 151 MG/DL
POTASSIUM SERPL-SCNC: 4.2 MMOL/L (ref 3.5–5.1)
PROT SERPL-MCNC: 7.1 G/DL (ref 6.4–8.2)
SODIUM SERPL-SCNC: 139 MMOL/L (ref 136–145)
TRIGL SERPL-MCNC: 89 MG/DL (ref 0–150)
VLDLC SERPL CALC-MCNC: 18 MG/DL

## 2025-06-06 PROCEDURE — 90471 IMMUNIZATION ADMIN: CPT | Performed by: NURSE PRACTITIONER

## 2025-06-06 PROCEDURE — 99397 PER PM REEVAL EST PAT 65+ YR: CPT | Performed by: NURSE PRACTITIONER

## 2025-06-06 PROCEDURE — 3074F SYST BP LT 130 MM HG: CPT | Performed by: NURSE PRACTITIONER

## 2025-06-06 PROCEDURE — 90750 HZV VACC RECOMBINANT IM: CPT | Performed by: NURSE PRACTITIONER

## 2025-06-06 PROCEDURE — 3079F DIAST BP 80-89 MM HG: CPT | Performed by: NURSE PRACTITIONER

## 2025-06-06 SDOH — ECONOMIC STABILITY: FOOD INSECURITY: WITHIN THE PAST 12 MONTHS, THE FOOD YOU BOUGHT JUST DIDN'T LAST AND YOU DIDN'T HAVE MONEY TO GET MORE.: NEVER TRUE

## 2025-06-06 SDOH — ECONOMIC STABILITY: FOOD INSECURITY: WITHIN THE PAST 12 MONTHS, YOU WORRIED THAT YOUR FOOD WOULD RUN OUT BEFORE YOU GOT MONEY TO BUY MORE.: NEVER TRUE

## 2025-06-06 ASSESSMENT — ENCOUNTER SYMPTOMS
BACK PAIN: 1
RESPIRATORY NEGATIVE: 1
GASTROINTESTINAL NEGATIVE: 1

## 2025-06-06 NOTE — PROGRESS NOTES
Xiomara Marcos (:  1955) is a 70 y.o. female,{New vs Established:[id number]::\"Established patient\"}, here for evaluation of the following chief complaint(s):  Annual Exam      Assessment & Plan   ASSESSMENT/PLAN:  1. Annual physical exam  ***    2. Hypertension, unspecified type  ***    3. Pure hypercholesterolemia  ***    4. Prediabetes  ***       Return for yearly physical.         Subjective   SUBJECTIVE/OBJECTIVE:  HPI  Presents today for annual physical. PMH sig for HTN, prediabetes and osteopenia   HTN- randomly checks with wrist cuff and it runs high and then it will be normal.  Denies vision concerns, HA, CP, palpations, leg swelling or fatigue. Follows low salt diet. Avoids processed foods. 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   Left ankle swells on work days. Doing stretches to help with right sided sciatica   Prediabetes- low carb, down 14 lbs over the last few years. . Walks daily avoids sugary foods.   Osteopenia- eats lots of yogurt and takes Vit D supplement. Walks daily and get steps at park.  UTD on vision and dental exam.   Due for colonoscopy, Dr. Strauss, waiting for medicare will schedule.   Due for shingrex,     Current Outpatient Medications   Medication Sig Dispense Refill    dilTIAZem (CARDIZEM CD) 240 MG extended release capsule TAKE 1 CAPSULE BY MOUTH EVERY DAY 90 capsule 2    montelukast (SINGULAIR) 10 MG tablet TAKE 1 TABLET BY MOUTH EVERY DAY AT NIGHT 90 tablet 0    atorvastatin (LIPITOR) 40 MG tablet TAKE 1 TABLET BY MOUTH EVERY DAY 90 tablet 1    fluticasone (FLONASE) 50 MCG/ACT nasal spray USE 2 SPRAYS NASALLY DAILY 48 each 1    cetirizine (ZYRTEC) 10 MG tablet Take 1 tablet by mouth as needed for Allergies      vitamin D (CHOLECALCIFEROL) 1000 UNIT TABS tablet Take 1 tablet by mouth daily      Omega-3 Fatty Acids (FISH OIL PO) Take  by mouth.      aspirin 81 MG tablet Take 1 tablet by mouth daily       No current facility-administered

## 2025-06-06 NOTE — PROGRESS NOTES
Xiomara Marcos (:  1955) is a 70 y.o. female,Established patient, here for evaluation of the following chief complaint(s):  Annual Exam      Assessment & Plan   ASSESSMENT/PLAN:  1. Annual physical exam  Reviewed care gaps and immunizations.     2. Hypertension, unspecified type  Stable due for labs  - Comprehensive Metabolic Panel; Future    3. Pure hypercholesterolemia  Stable due for labs  - Lipid, Fasting; Future  - Comprehensive Metabolic Panel; Future    4. Prediabetes  Stable due for labs  - Hemoglobin A1C; Future    5. Need for shingles vaccine  Updated in office today.   - Zoster, SHINGRIX, (18 yrs +), IM       Return for yearly physical.         Subjective   SUBJECTIVE/OBJECTIVE:  HPI  Presents today for annual physical. PMH sig for HTN, prediabetes and osteopenia     HTN- randomly checks with wrist cuff and it runs high and then it will be normal.  Denies vision concerns, HA, CP, palpations, leg swelling or fatigue. Follows low salt diet. Avoids processed foods. 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      Prediabetes- low carb, down 14 lbs over the last few years. . Walks daily avoids sugary foods.      Osteopenia- eats lots of yogurt and takes Vit D supplement. Walks daily and get steps at park.   UTD on vision and dental exam.   Due for colonoscopy, Dr. Strauss, waiting for medicare will schedule.   Due for shingrex,  Current Outpatient Medications   Medication Sig Dispense Refill    dilTIAZem (CARDIZEM CD) 240 MG extended release capsule TAKE 1 CAPSULE BY MOUTH EVERY DAY 90 capsule 2    montelukast (SINGULAIR) 10 MG tablet TAKE 1 TABLET BY MOUTH EVERY DAY AT NIGHT 90 tablet 0    atorvastatin (LIPITOR) 40 MG tablet TAKE 1 TABLET BY MOUTH EVERY DAY 90 tablet 1    fluticasone (FLONASE) 50 MCG/ACT nasal spray USE 2 SPRAYS NASALLY DAILY 48 each 1    cetirizine (ZYRTEC) 10 MG tablet Take 1 tablet by mouth as needed for Allergies      vitamin D (CHOLECALCIFEROL) 1000

## 2025-06-09 ENCOUNTER — RESULTS FOLLOW-UP (OUTPATIENT)
Dept: FAMILY MEDICINE CLINIC | Age: 70
End: 2025-06-09

## 2025-06-09 DIAGNOSIS — R79.89 ELEVATED LFTS: ICD-10-CM

## 2025-06-09 DIAGNOSIS — R73.03 PREDIABETES: Primary | ICD-10-CM

## 2025-07-31 RX ORDER — MONTELUKAST SODIUM 10 MG/1
10 TABLET ORAL NIGHTLY
Qty: 90 TABLET | Refills: 0 | Status: SHIPPED | OUTPATIENT
Start: 2025-07-31

## 2025-08-30 DIAGNOSIS — E78.00 PURE HYPERCHOLESTEROLEMIA: ICD-10-CM

## 2025-09-02 RX ORDER — ATORVASTATIN CALCIUM 40 MG/1
40 TABLET, FILM COATED ORAL DAILY
Qty: 90 TABLET | Refills: 1 | Status: SHIPPED | OUTPATIENT
Start: 2025-09-02